# Patient Record
Sex: MALE | Race: WHITE | NOT HISPANIC OR LATINO | Employment: FULL TIME | URBAN - METROPOLITAN AREA
[De-identification: names, ages, dates, MRNs, and addresses within clinical notes are randomized per-mention and may not be internally consistent; named-entity substitution may affect disease eponyms.]

---

## 2021-03-11 ENCOUNTER — HOSPITAL ENCOUNTER (EMERGENCY)
Facility: CLINIC | Age: 31
Discharge: HOME | End: 2021-03-11
Attending: EMERGENCY MEDICINE | Admitting: EMERGENCY MEDICINE
Payer: MEDICAID

## 2021-03-11 VITALS
SYSTOLIC BLOOD PRESSURE: 133 MMHG | OXYGEN SATURATION: 100 % | RESPIRATION RATE: 18 BRPM | TEMPERATURE: 98.1 F | DIASTOLIC BLOOD PRESSURE: 88 MMHG | HEART RATE: 106 BPM | WEIGHT: 146 LBS

## 2021-03-11 DIAGNOSIS — L02.414 CUTANEOUS ABSCESS OF LEFT UPPER EXTREMITY: ICD-10-CM

## 2021-03-11 PROCEDURE — 99283 EMERGENCY DEPT VISIT LOW MDM: CPT | Mod: 25 | Performed by: EMERGENCY MEDICINE

## 2021-03-11 PROCEDURE — 87186 SC STD MICRODIL/AGAR DIL: CPT | Performed by: EMERGENCY MEDICINE

## 2021-03-11 PROCEDURE — 10060 I&D ABSCESS SIMPLE/SINGLE: CPT | Performed by: EMERGENCY MEDICINE

## 2021-03-11 PROCEDURE — 87077 CULTURE AEROBIC IDENTIFY: CPT | Performed by: EMERGENCY MEDICINE

## 2021-03-11 PROCEDURE — 87070 CULTURE OTHR SPECIMN AEROBIC: CPT | Performed by: EMERGENCY MEDICINE

## 2021-03-11 RX ORDER — SULFAMETHOXAZOLE/TRIMETHOPRIM 800-160 MG
1 TABLET ORAL 2 TIMES DAILY
Qty: 14 TABLET | Refills: 0 | Status: SHIPPED | OUTPATIENT
Start: 2021-03-11 | End: 2021-03-18

## 2021-03-11 NOTE — DISCHARGE INSTRUCTIONS
-May shower daily  -Keep the abscess covered until it heals over to avoid any infection spread.  -When changing of the dressing apply rubber gloves and disposable gloves and dressing into a plastic lined trash bag.    -You may enter rehab safely with no risk to other rehab participants as long as you keep the left forearm abscess properly covered and provide good hygiene when changing dressings.  -If you continue having a plugged sensation in your ear due to wax you may purchase over-the-counter Debrox.

## 2021-03-11 NOTE — ED TRIAGE NOTES
Pt is a Heroin addict.  Has been going through withdrawal about a week. Currently trying to go to treatment this afternoon.  He needs to be medically clear to be admitted there.

## 2021-03-12 ENCOUNTER — TELEPHONE (OUTPATIENT)
Dept: FAMILY MEDICINE | Facility: CLINIC | Age: 31
End: 2021-03-12

## 2021-03-12 NOTE — ED PROVIDER NOTES
History     Chief Complaint   Patient presents with     Wound Check     HPI  Ulysses Fajardo is a 31 year old male who presents for evaluation of skin abscess.  The patient has a history for heroin addiction.  IV use with frequent injections in his arms occasionally in his feet.  He has gone through initial phases of narcotic withdrawal/detox.  He is planning to enter a residential treatment facility this evening.  He is here with staff.  Staff members name is Mingo.  Mingo wanted him screened for any risk of potential soft tissue infections that could be potentially contagious specifically MRSA.  Patient has had no prior documentation for MRSA.  His primary concern is a fluctuant area on the left outer forearm.    Allergies:  No Known Allergies    Problem List:    There are no active problems to display for this patient.       Past Medical History:    No past medical history on file.    Past Surgical History:    No past surgical history on file.    Family History:    No family history on file.    Social History:  Marital Status:  Single [1]  Social History     Tobacco Use     Smoking status: Not on file   Substance Use Topics     Alcohol use: Not on file     Drug use: Not on file        Medications:    sulfamethoxazole-trimethoprim (BACTRIM DS) 800-160 MG tablet          Review of Systems   All other systems reviewed and are negative.      Physical Exam   BP: 133/88  Pulse: 106  Temp: 98.1  F (36.7  C)  Resp: 18  Weight: 66.2 kg (146 lb)  SpO2: 100 %      Physical Exam  Constitutional:       General: He is not in acute distress.     Appearance: He is not diaphoretic.   HENT:      Head: Atraumatic.   Eyes:      General: No scleral icterus.     Pupils: Pupils are equal, round, and reactive to light.   Cardiovascular:      Heart sounds: Normal heart sounds.   Pulmonary:      Effort: No respiratory distress.      Breath sounds: Normal breath sounds.   Abdominal:      General: Bowel sounds are normal.      Palpations:  Abdomen is soft.      Tenderness: There is no abdominal tenderness.   Musculoskeletal:         General: No tenderness.   Skin:     General: Skin is warm.      Findings: No rash.      Comments: Total-body skin survey notes that he has got extensive scarring on the upper arms and lower arms bilateral from prior IV drug use.  On his left forearm he has a 3 x 4 cm area of fluctuance its raised about 4mm.  There is no surrounding erythema and it had minimal tenderness.  There is an opening at the superior margin of the soft tissue abscess we had some evidence for recent spontaneous drainage.  There is no associated axillary adenopathy or lymphangitic spread of concern for infection.  The patient had no lesions of the skin on his chest abdomen back or groin.  Lower extremities did show some scarring from IV site use on the dorsum of his feet but nothing between the toes.  Onychomycosis of the right great toenail was noted with some dystrophic change.           ED Course        Procedures  Verbal consent from patient's  Left forearm was prepped with chlorhexidine  Infiltrated 1% lidocaine with epinephrine using a 30-gauge needle.  #11 blade was used to complete incision and drainage.  Approximately 1.5 cm incision was made  And this was extended into a small cruciate incision.  Drained less than 5 cc of purulent discharge with milking of the wound.  A culture was sent.  Dressing was applied.                     Assessments & Plan (with Medical Decision Making)  31-year-old male.  Detoxifying from heroin use.  Frequent use of heroin last 13 years with IV injection.  He is entering a residential treatment program.  Brought in to be screened for any contagious concerns including skin lesions abscesses that could potentially present MRSA.  We did note that on total skin assessment that he did have an abscess that was subcutaneous in his left forearm on the outer aspect there was an area 3 x 4 cm it was fluctuant.  He allowed  us to go through incision and drainage.  Culture was sent and is pending.     I have reviewed the nursing notes.    I have reviewed the findings, diagnosis, plan and need for follow up with the patient.      Discharge Medication List as of 3/11/2021  2:08 PM      START taking these medications    Details   sulfamethoxazole-trimethoprim (BACTRIM DS) 800-160 MG tablet Take 1 tablet by mouth 2 times daily for 7 days, Disp-14 tablet, R-0, E-Prescribe             Final diagnoses:   Cutaneous abscess of left upper extremity       3/11/2021   [facility] EMERGENCY DEPT     Josesito Benz,   03/11/21 1187

## 2021-03-12 NOTE — TELEPHONE ENCOUNTER
Patient is calling to report he was in the ED yesterday and they flushed his ear.  He states it was reported that he had fluid behind his ear drum.  He states he is still having a hard time hearing.    RN informed him to use a warm pack to his ear over the weekend along with a decongestant to dry up the fluid in his ear.  He is informed that if he develops a fever or severe pain, he can call back on Monday, 3/15/21, for a visit.  He is also on Bactrim, so should be fine over the weekend.    Closing this encounter.  Sudha Cardozo, THELMAN, RN

## 2021-03-12 NOTE — RESULT ENCOUNTER NOTE
[place] ED discharge antibiotic (if prescribed):  Sulfamethoxazole-Trimethoprim (Bactrim DS, Septra DS) 800-160 mg PO tablet,  1 tablet by mouth 2 times daily for 7 days.  Incision and Drainage performed in [place] ED [Yes / No] : No  No changes in treatment per ED lab result protocol.

## 2021-03-13 LAB
BACTERIA SPEC CULT: ABNORMAL
Lab: ABNORMAL
SPECIMEN SOURCE: ABNORMAL

## 2021-03-14 ENCOUNTER — TELEPHONE (OUTPATIENT)
Dept: EMERGENCY MEDICINE | Facility: CLINIC | Age: 31
End: 2021-03-14

## 2021-03-14 NOTE — TELEPHONE ENCOUNTER
[facility] Emergency Department Lab result notification:    Reason for call  Wound/abscess culture results   Lab Result  Final Wound culture (Left arm abscess) report on 3/13/21  Emergency Dept discharge antibiotic prescribed: Sulfamethoxazole-Trimethoprim (Bactrim DS, Septra DS) 800-160 mg PO tablet,  1 tablet by mouth 2 times daily for 7 days.  #1. Bacteria, Moderate growth Methicillin resistant Staphylococcus aureus (MRSA, which is [SUSCEPTIBLE] to antibiotic   Incision and Drainage performed in [place] ED [Yes / No]: Yes  Patient to be notified of result, symptoms assessed and advised per [place] ED lab result protocol.  ED visit Date: 3/11/21  Symptoms reported at ED visit  Wound Check      HPI  Ulysses Fajardo is a 31 year old male who presents for evaluation of skin abscess.  The patient has a history for heroin addiction.  IV use with frequent injections in his arms occasionally in his feet.  He has gone through initial phases of narcotic withdrawal/detox.  He is planning to enter a residential treatment facility this evening.  He is here with staff.  Staff members name is Mingo.  Mingo wanted him screened for any risk of potential soft tissue infections that could be potentially contagious specifically MRSA.  Patient has had no prior documentation for MRSA.  His primary concern is a fluctuant area on the left outer forearm.     Miscellaneous information      Current symptoms  3:13 Left with family member message requesting a call back to [facility] ED Lab Result RN at [phone].  RN is available every day between 10 a.m. and 6:30 p.m.      3:31 Spoke with patient he states the area is improving no pain stopped draining yesterday, no red streaks some redness at the site but patient states it is improved, no fevers.   Patient will continue home cares and antibiotics. Patient stated understanding and had no further questions. Reviewed signs and symptoms of when to be seen in ED  again, did offer follow up appointment, suggested urgent care as well but patient declined.  [RN Name]  Jess Pulido  [facility]  Emergency Dept Lab Result RN  Ph# [phone]      Copy of Lab result   Contains abnormal data Wound Culture Aerobic Bacterial  Order: [id number]  Status:  Final result   Visible to patient:  No (inaccessible in MyChart) Dx:  Cutaneous abscess of left upper extre...  Specimen Information: Left Arm    Abscess        Component 3d ago   Specimen Description Left Arm Abscess    Special Requests Specimen collected in eSwab transport (white cap)    Culture Micro Abnormal   Moderate growth   Methicillin resistant Staphylococcus aureus (MRSA)     [facility]   Susceptibility    Methicillin resistant staphylococcus aureus (mrsa) (1)    Antibiotic Interpretation Sensitivity Method Status   CLINDAMYCIN Sensitive <=0.25 ug/mL FLEX Final    This isolate DOES NOT demonstrate inducible clindamycin resistance in vitro.   Clindamycin is susceptible and could be used when indicated, however,   erythromycin is resistant and should not be used.   ERYTHROMYCIN Resistant >8.0 ug/mL FLEX Final   GENTAMICIN Sensitive <=0.5 ug/mL FLEX Final   OXACILLIN Resistant >4.0 ug/mL FLEX Final   TETRACYCLINE Sensitive <=1.0 ug/mL FLEX Final   Trimethoprim/Sulfa Sensitive <=0.5/9.5 ug/mL FLEX Final   VANCOMYCIN Sensitive 1.0 ug/mL FLEX Final   LINEZOLID Sensitive 2.0 ug/mL FLEX Final         Specimen Collected: 03/11/21  1:28 PM Last Resulted: 03/13/21 11:45 PM

## 2021-03-16 ENCOUNTER — HOSPITAL ENCOUNTER (EMERGENCY)
Facility: CLINIC | Age: 31
Discharge: HOME | End: 2021-03-16
Attending: FAMILY MEDICINE | Admitting: FAMILY MEDICINE
Payer: MEDICAID

## 2021-03-16 ENCOUNTER — APPOINTMENT (OUTPATIENT)
Dept: CT IMAGING | Facility: CLINIC | Age: 31
End: 2021-03-16
Attending: FAMILY MEDICINE
Payer: MEDICAID

## 2021-03-16 VITALS
WEIGHT: 153.2 LBS | SYSTOLIC BLOOD PRESSURE: 117 MMHG | HEART RATE: 82 BPM | OXYGEN SATURATION: 99 % | DIASTOLIC BLOOD PRESSURE: 61 MMHG | RESPIRATION RATE: 18 BRPM | TEMPERATURE: 98.5 F

## 2021-03-16 DIAGNOSIS — G43.109 MIGRAINE WITH AURA AND WITHOUT STATUS MIGRAINOSUS, NOT INTRACTABLE: ICD-10-CM

## 2021-03-16 DIAGNOSIS — F41.9 ANXIETY: ICD-10-CM

## 2021-03-16 LAB — GLUCOSE BLDC GLUCOMTR-MCNC: 93 MG/DL (ref 70–99)

## 2021-03-16 PROCEDURE — 96372 THER/PROPH/DIAG INJ SC/IM: CPT | Performed by: FAMILY MEDICINE

## 2021-03-16 PROCEDURE — 99285 EMERGENCY DEPT VISIT HI MDM: CPT | Mod: 25 | Performed by: FAMILY MEDICINE

## 2021-03-16 PROCEDURE — 70450 CT HEAD/BRAIN W/O DYE: CPT

## 2021-03-16 PROCEDURE — 250N000011 HC RX IP 250 OP 636: Performed by: FAMILY MEDICINE

## 2021-03-16 PROCEDURE — 999N001017 HC STATISTIC GLUCOSE BY METER IP

## 2021-03-16 PROCEDURE — 93005 ELECTROCARDIOGRAM TRACING: CPT | Performed by: FAMILY MEDICINE

## 2021-03-16 PROCEDURE — 99284 EMERGENCY DEPT VISIT MOD MDM: CPT | Performed by: FAMILY MEDICINE

## 2021-03-16 RX ORDER — KETOROLAC TROMETHAMINE 30 MG/ML
60 INJECTION, SOLUTION INTRAMUSCULAR; INTRAVENOUS ONCE
Status: COMPLETED | OUTPATIENT
Start: 2021-03-16 | End: 2021-03-16

## 2021-03-16 RX ORDER — HYDROXYZINE PAMOATE 25 MG/1
25 CAPSULE ORAL 3 TIMES DAILY PRN
Qty: 20 CAPSULE | Refills: 0 | Status: SHIPPED | OUTPATIENT
Start: 2021-03-16 | End: 2021-07-14

## 2021-03-16 RX ADMIN — KETOROLAC TROMETHAMINE 60 MG: 30 INJECTION, SOLUTION INTRAMUSCULAR at 17:32

## 2021-03-16 NOTE — ED PROVIDER NOTES
History     Chief Complaint   Patient presents with     Eye Problem     Anxiety     HPI  Ulysses Fajardo is a 31 year old male who presents with concerns of blurry vision that started about 20 to 30 minutes ago.  Patient was just walking when he noticed the symptoms.  He felt like he might even blackout.  He got concerned because his sister recently was diagnosed with a stroke and so he came in for evaluation.  Patient states that he feels like his vision is starting to get better now.  Patient does have a history of anxiety and is not on medications for it.  He also has been under a lot of stress and has not been sleeping here recently.  Denies any fevers or chills.  Denies a headache, denies any chest pain or shortness of breath.    Allergies:  No Known Allergies    Problem List:    There are no active problems to display for this patient.       Past Medical History:    No past medical history on file.    Past Surgical History:    No past surgical history on file.    Family History:    No family history on file.    Social History:  Marital Status:  Single [1]  Social History     Tobacco Use     Smoking status: Not on file   Substance Use Topics     Alcohol use: Not on file     Drug use: Not on file        Medications:    sulfamethoxazole-trimethoprim (BACTRIM DS) 800-160 MG tablet          Review of Systems   All other systems reviewed and are negative.      Physical Exam   BP: (!) 140/109  Pulse: 109  Temp: 98.5  F (36.9  C)  Resp: 18  Weight: 69.5 kg (153 lb 3.2 oz)  SpO2: 100 %      Physical Exam  Vitals signs and nursing note reviewed.   Constitutional:       General: He is not in acute distress.     Appearance: He is well-developed. He is not diaphoretic.   HENT:      Head: Normocephalic and atraumatic.      Nose: Nose normal.      Mouth/Throat:      Pharynx: No oropharyngeal exudate.   Eyes:      General: No scleral icterus.     Conjunctiva/sclera: Conjunctivae normal.      Pupils: Pupils are equal,  round, and reactive to light.   Neck:      Musculoskeletal: Normal range of motion.   Cardiovascular:      Rate and Rhythm: Normal rate and regular rhythm.      Heart sounds: Normal heart sounds. No murmur. No friction rub.   Pulmonary:      Effort: Pulmonary effort is normal. No respiratory distress.      Breath sounds: Normal breath sounds. No wheezing or rales.   Abdominal:      General: Bowel sounds are normal. There is no distension.      Palpations: Abdomen is soft. There is no mass.      Tenderness: There is no abdominal tenderness. There is no guarding or rebound.   Musculoskeletal: Normal range of motion.         General: No tenderness.   Skin:     General: Skin is warm.      Findings: No rash.   Neurological:      Mental Status: He is alert and oriented to person, place, and time.      Comments: National [employer] Stroke Scale  Exam Interval: Baseline   Score   Level of consciousness: (0)   Alert, keenly responsive   LOC questions: (0)   Answers both questions correctly   LOC commands: (0)   Performs both tasks correctly   Best gaze: (0)   Normal   Visual: (0)   No visual loss   Facial palsy: (0)   Normal symmetrical movements   Motor arm (left): (0)   No drift   Motor arm (right): (0)   No drift   Motor leg (left): (0)   No drift   Motor leg (right): (0)   No drift   Limb ataxia: (0)   Absent   Sensory: (0)   Normal- no sensory loss   Best language: (0)   Normal- no aphasia   Dysarthria: (0)   Normal   Extinction and inattention: (0)   No abnormality      Total Score:  0     Psychiatric:         Judgment: Judgment normal.         ED Course  Code stroke was called by nursing because of the visual changes that happened 30 minutes ago and because the sister had a stroke who is 1 year older than him.  Patient had no other neurological deficits.  This was quickly deescalated once I saw and examined the patient.        Procedures      Results for orders placed or performed during the hospital  encounter of 03/16/21 (from the past 24 hour(s))   Glucose by meter   Result Value Ref Range    Glucose 93 70 - 99 mg/dL   CT Head w/o Contrast    Narrative    CT SCAN OF THE HEAD WITHOUT CONTRAST   3/16/2021 5:03 PM     HISTORY: Headaches and visual changes    TECHNIQUE:  Axial images of the head and coronal reformations without  IV contrast material. Radiation dose for this scan was reduced using  automated exposure control, adjustment of the mA and/or kV according  to patient size, or iterative reconstruction technique.    COMPARISON: None.    FINDINGS:  The cerebral hemispheres, brainstem, and cerebellum demonstrate normal  morphology and attenuation. No evidence of acute ischemia, hemorrhage,  mass, mass effect or hydrocephalus. The visualized calvarium, tympanic  cavities, mastoid cavities, and paranasal sinuses are unremarkable.      Impression    IMPRESSION:   No acute intracranial abnormality.    FRANCHESCA NEWTON MD       Medications - No data to display     CT scan was obtained and was unremarkable.  Patient continues to have no further visual changes but he is starting to get a little bit of a headache.  Talking to the patient further he has a history of recurrent headaches where he gets very nauseated and gets light sensitivity.  Because he has been dealing with a lot of stress, he has not been sleeping well here recently.  I think that his symptoms with his vision or most likely preaura and he is starting to get a migraine now at this point.  I am not suspecting a stroke or any significant intracranial process.  We will give the patient a shot of Toradol here now and discharge the patient home on Vistaril.  This should help him be able to sleep and help with some of his anxiety symptoms.  Patient is safe to be discharged at this time.    Assessments & Plan (with Medical Decision Making)  Migraine with aura, anxiety     I have reviewed the nursing notes.    I have reviewed the findings, diagnosis, plan  and need for follow up with the patient.      3/16/2021   [facility] EMERGENCY DEPT     Dale Godinez MD  03/16/21 [id number]

## 2021-03-16 NOTE — ED TRIAGE NOTES
He has had blurred vision for the past 20 minutes. He says he is very anxious and has had blurred vision with anxiety in the past but this is lasting longer. He also has stopped using heroin for 10 days and lost his sister who was 1 year older than him from a stroke 3 days ago.

## 2021-04-13 ENCOUNTER — HOSPITAL ENCOUNTER (EMERGENCY)
Facility: CLINIC | Age: 31
Discharge: HOME | End: 2021-04-13
Attending: NURSE PRACTITIONER | Admitting: NURSE PRACTITIONER
Payer: MEDICAID

## 2021-04-13 VITALS
RESPIRATION RATE: 18 BRPM | TEMPERATURE: 98.5 F | SYSTOLIC BLOOD PRESSURE: 140 MMHG | DIASTOLIC BLOOD PRESSURE: 84 MMHG | OXYGEN SATURATION: 98 % | HEART RATE: 90 BPM

## 2021-04-13 DIAGNOSIS — J06.9 VIRAL URI: ICD-10-CM

## 2021-04-13 LAB
FLUAV RNA RESP QL NAA+PROBE: NEGATIVE
FLUBV RNA RESP QL NAA+PROBE: NEGATIVE
LABORATORY COMMENT REPORT: ABNORMAL
RSV RNA SPEC QL NAA+PROBE: NEGATIVE
SARS-COV-2 RNA RESP QL NAA+PROBE: POSITIVE
SPECIMEN SOURCE: ABNORMAL

## 2021-04-13 PROCEDURE — 87636 SARSCOV2 & INF A&B AMP PRB: CPT | Performed by: NURSE PRACTITIONER

## 2021-04-13 PROCEDURE — 99284 EMERGENCY DEPT VISIT MOD MDM: CPT | Performed by: NURSE PRACTITIONER

## 2021-04-13 PROCEDURE — 99283 EMERGENCY DEPT VISIT LOW MDM: CPT | Performed by: NURSE PRACTITIONER

## 2021-04-13 PROCEDURE — C9803 HOPD COVID-19 SPEC COLLECT: HCPCS | Performed by: NURSE PRACTITIONER

## 2021-04-13 NOTE — DISCHARGE INSTRUCTIONS
Covid-19 test obtained and is pending. Likely you will have the results later today or tomorrow morning.  Continue to apply ear drops at bedtime, then do hot showers in the morning.  If persistent for more than 5 more days recheck for ear irrigation.

## 2021-04-13 NOTE — ED TRIAGE NOTES
"Pt stated \"Im in a treatment facility currently and everyone has tested positive for COVID except me. I am now starting to feel congested. Also I cant really hear out of my right ear.\"  "

## 2021-04-13 NOTE — ED PROVIDER NOTES
History     Chief Complaint   Patient presents with     Covid Concern     HPI  Ulysses Fajardo is a 31 year old male who presents to the emergency department for evaluation of nasal congestion and exposure to covid-19 infection.  Patient is currently residing at a treatment facility and states that many of the residents there have tested positive for COVID-19.  Patient is requesting testing for Covid today.  Denies fever or chills.  Patient denies chest pain or shortness of breath.  Denies significant cough.  Denies diarrhea. Pt complains of diminished hearing from right ear, he is currently treating cerumen impaction with over the counter ear drops.    Allergies:  No Known Allergies    Problem List:    There are no active problems to display for this patient.       Past Medical History:    History reviewed. No pertinent past medical history.    Past Surgical History:    No past surgical history on file.    Family History:    No family history on file.    Social History:  Marital Status:  Single [1]  Social History     Tobacco Use     Smoking status: None   Substance Use Topics     Alcohol use: None     Drug use: None        Medications:    hydrOXYzine (VISTARIL) 25 MG capsule          Review of Systems  As mentioned above in the history present illness. All other systems were reviewed and are negative.    Physical Exam   BP: (!) 142/82  Pulse: 92  Temp: 98.5  F (36.9  C)  Resp: 18  SpO2: 99 %      Physical Exam    GENERAL APPEARANCE: alert and oriented. NAD.   EYES: conjunctiva clear  HENT: Right ear canal with cerumen impaction, unable to visualize the right TM. Left ear canal with moderate cerumen. Left  TM normal. Nose normal.  Oropharynx without ulcers, erythema or lesions  NECK: supple, nontender, no lymphadenopathy  RESP: lungs clear to auscultation - no rales, rhonchi or wheezes  CV: regular rates and rhythm, normal S1 S2, no murmur noted      ED Course        Procedures                 No results found  for this or any previous visit (from the past 24 hour(s)).    Medications - No data to display    Assessments & Plan (with Medical Decision Making)   Covid-19 test obtained and is pending. Likely you will have the results later today or tomorrow morning.  Continue to apply ear drops at bedtime, then do hot showers in the morning.  If persistent for more than 5 more days recheck for ear irrigation.    I have reviewed the nursing notes.    I have reviewed the findings, diagnosis, plan and need for follow up with the patient.      Discharge Medication List as of 4/13/2021  3:09 PM          Final diagnoses:   Viral URI - covid test is pending.       4/13/2021   [facility] EMERGENCY DEPT     Brunilda, YISSEL Atkinson CNP  04/13/21 1549

## 2021-04-14 ENCOUNTER — TELEPHONE (OUTPATIENT)
Dept: EMERGENCY MEDICINE | Facility: CLINIC | Age: 31
End: 2021-04-14

## 2021-04-14 NOTE — TELEPHONE ENCOUNTER
"Coronavirus (COVID-19) Notification    Caller Name (Patient, parent, daughter/son, grandparent, etc)  Patient    Reason for call  Notify of Positive Coronavirus (COVID-19) lab results, assess symptoms,  review  [facility] recommendations    Lab Result    Lab test:  2019-nCoV rRt-PCR or SARS-CoV-2 PCR    Oropharyngeal AND/OR nasopharyngeal swabs is POSITIVE for 2019-nCoV RNA/SARS-COV-2 PCR (COVID-19 virus)    RN Recommendations/Instructions per [facility] Coronavirus COVID-19 recommendations    Brief introduction script  Introduce self then review script:  \"I am calling on behalf of [employer].  We were notified that your Coronavirus test (COVID-19) for was POSITIVE for the virus.  I have some information to relay to you but first I wanted to mention that the [place] Dept of Health will be contacting you shortly [it's possible MD already called Patient] to talk to you more about how you are feeling and other people you have had contact with who might now also have the virus.  Also,  [employer] is Partnering with the [facility] for Covid-19 research, you may be contacted directly by research staff.\"    Assessment (Inquire about Patient's current symptoms)   Assessment   Current Symptoms at time of phone call: (if no symptoms, document No symptoms] No symptoms   Symptoms onset (if applicable) n/a     If at time of call, Patients symptoms hare worsened, the Patient should contact 911 or have someone drive them to Emergency Dept promptly:      If Patient calling 911, inform 911 personal that you have tested positive for the Coronavirus (COVID-19).  Place mask on and await 911 to arrive.    If Emergency Dept, If possible, please have another adult drive you to the Emergency Dept but you need to wear mask when in contact with other people.      Monoclonal Antibody Administration    You may be eligible to receive a new treatment with a monoclonal antibody for preventing hospitalization in patients " "at high risk for complications from COVID-19.   This medication is still experimental and available on a limited basis; it is given through an IV and must be given at an infusion center. Please note that not all people who are eligible will receive the medication since it is in limited supply.     Are you interested in being considered for this medication?  Yes.   Is the patient symptomatic? No. Patient does not qualify.  Does the patient fit the criteria: No    If patient qualifies based on above criteria:  \"You will be contacted if you are selected to receive this treatment in the next 1-2 business days.   This is time sensitive and if you are not selected in the next 1-2 business days, you will not receive the medication.  If you do not receive a call to schedule, you have not been selected.\"      Review information with Patient    Your result was positive. This means you have COVID-19 (coronavirus).  We have sent you a letter that reviews the information that I'll be reviewing with you now.    How can I protect others?    If you have symptoms: stay home and away from others (self-isolate) until:    You've had no fever--and no medicine that reduces fever--for 1 full day (24 hours). And       Your other symptoms have gotten better. For example, your cough or breathing has improved. And     At least 10 days have passed since your symptoms started. (If you've been told by a doctor that you have a weak immune system, wait 20 days.)     If you don't have symptoms: Stay home and away from others (self-isolate) until at least 10 days have passed since your first positive COVID-19 test. (Date test collected)    During this time:    Stay in your own room, including for meals. Use your own bathroom if you can.    Stay away from others in your home. No hugging, kissing or shaking hands. No visitors.     Don't go to work, school or anywhere else.     Clean  high touch  surfaces often (doorknobs, counters, handles, etc.). Use " a household cleaning spray or wipes. You'll find a full list on the EPA website at www.epa.gov/pesticide-registration/list-n-disinfectants-use-against-sars-cov-2.     Cover your mouth and nose with a mask, tissue or other face covering to avoid spreading germs.    Wash your hands and face often with soap and water.    Make a list of people you have been in close contact with recently, even if either of you wore a face covering.   ; Start your list from 2 days before you became ill or had a positive test.  ; Include anyone that was within 6 feet of you for a cumulative total of 15 minutes or more in 24 hours. (Example: if you sat next to Paul for 5 minutes in the morning and 10 minutes in the afternoon, then you were in close contact for 15 minutes total that day. Paul would be added to your list.)    A public health worker will call or text you. It is important that you answer. They will ask you questions about possible exposures to COVID-19, such as people you have been in direct contact with and places you have visited.    Tell the people on your list that you have COVID-19; they should stay away from others for 14 days starting from the last time they were in contact with you (unless you are told something different from a public health worker).     Caregivers in these groups are at risk for severe illness due to COVID-19:  o People 65 years and older  o People who live in a nursing home or long-term care facility  o People with chronic disease (lung, heart, cancer, diabetes, kidney, liver, immunologic)  o People who have a weakened immune system, including those who:  - Are in cancer treatment  - Take medicine that weakens the immune system, such as corticosteroids  - Had a bone marrow or organ transplant  - Have an immune deficiency  - Have poorly controlled HIV or AIDS  - Are obese (body mass index of 40 or higher)  - Smoke regularly    Caregivers should wear gloves while washing dishes, handling laundry and  cleaning bedrooms and bathrooms.    Wash and dry laundry with special caution. Don't shake dirty laundry, and use the warmest water setting you can.    If you have a weakened immune system, ask your doctor about other actions you should take.    For more tips, go to www.cdc.gov/coronavirus/2019-ncov/downloads/10Things.pdf.    You should not go back to work until you meet the guidelines above for ending your home isolation. You don't need to be retested for COVID-19 before going back to work--studies show that you won't spread the virus if it's been at least 10 days since your symptoms started (or 20 days, if you have a weak immune system).    Employers: This document serves as formal notice of your employee's medical guidelines for going back to work. They must meet the above guidelines before going back to work in person.    How can I take care of myself?    1. Get lots of rest. Drink extra fluids (unless a doctor has told you not to).    2. Take Tylenol (acetaminophen) for fever or pain. If you have liver or kidney problems, ask your family doctor if it's okay to take Tylenol.     Take either:     650 mg (two 325 mg pills) every 4 to 6 hours, or     1,000 mg (two 500 mg pills) every 8 hours as needed.     Note: Don't take more than 3,000 mg in one day. Acetaminophen is found in many medicines (both prescribed and over-the-counter medicines). Read all labels to be sure you don't take too much.    For children, check the Tylenol bottle for the right dose (based on their age or weight).    3. If you have other health problems (like cancer, heart failure, an organ transplant or severe kidney disease): Call your specialty clinic if you don't feel better in the next 2 days.    4. Know when to call 911: Emergency warning signs include:    Trouble breathing or shortness of breath    Pain or pressure in the chest that doesn't go away    Feeling confused like you haven't felt before, or not being able to wake  up    Bluish-colored lips or face    5. Sign up for [employer]. We know it's scary to hear that you have COVID-19. We want to track your symptoms to make sure you're okay over the next 2 weeks. Please look for an email from [employer]--this is a free, online program that we'll use to keep in touch. To sign up, follow the link in the email. Learn more at [email or web address].    Where can I get more information?    [facility] [place]: [email or web address]    Coronavirus Basics: www.health.[facility].[place]./diseases/coronavirus/basics.html    What to Do If You're Sick: www.cdc.gov/coronavirus/2019-ncov/about/steps-when-sick.html    Ending Home Isolation: www.cdc.gov/coronavirus/2019-ncov/hcp/disposition-in-home-patients.html     Caring for Someone with COVID-19: www.cdc.gov/coronavirus/2019-ncov/if-you-are-sick/care-for-someone.html     [facility] clinical trials (COVID-19 research studies): clinicalaffairs.[facility]-clinical-trials     A Positive COVID-19 letter will be sent via "[employer] or the mail. (Exception, no letters sent to Presurgerical/Preprocedure Patients)    Salina Mercado LPN

## 2021-05-12 ENCOUNTER — IMMUNIZATION (OUTPATIENT)
Dept: FAMILY MEDICINE | Facility: CLINIC | Age: 31
End: 2021-05-12
Payer: COMMERCIAL

## 2021-05-12 PROCEDURE — 91301 PR COVID VAC MODERNA 100 MCG/0.5 ML IM: CPT

## 2021-05-12 PROCEDURE — 0011A PR COVID VAC MODERNA 100 MCG/0.5 ML IM: CPT

## 2021-07-14 ENCOUNTER — OFFICE VISIT (OUTPATIENT)
Dept: INTERNAL MEDICINE | Facility: CLINIC | Age: 31
End: 2021-07-14
Payer: COMMERCIAL

## 2021-07-14 VITALS
BODY MASS INDEX: 23.43 KG/M2 | SYSTOLIC BLOOD PRESSURE: 114 MMHG | OXYGEN SATURATION: 98 % | HEIGHT: 73 IN | RESPIRATION RATE: 16 BRPM | DIASTOLIC BLOOD PRESSURE: 66 MMHG | WEIGHT: 176.8 LBS | HEART RATE: 92 BPM | TEMPERATURE: 97.9 F

## 2021-07-14 DIAGNOSIS — Z87.898 HISTORY OF INTRAVENOUS DRUG USE IN REMISSION: ICD-10-CM

## 2021-07-14 DIAGNOSIS — Z82.49: ICD-10-CM

## 2021-07-14 DIAGNOSIS — B18.2 CHRONIC HEPATITIS C WITHOUT HEPATIC COMA (H): ICD-10-CM

## 2021-07-14 DIAGNOSIS — A49.02 MRSA INFECTION: ICD-10-CM

## 2021-07-14 DIAGNOSIS — Z00.01 ENCOUNTER FOR ROUTINE ADULT MEDICAL EXAM WITH ABNORMAL FINDINGS: Primary | ICD-10-CM

## 2021-07-14 LAB
ALBUMIN SERPL-MCNC: 4.6 G/DL (ref 3.4–5)
ALP SERPL-CCNC: 94 U/L (ref 40–150)
ALT SERPL W P-5'-P-CCNC: 21 U/L (ref 0–70)
ANION GAP SERPL CALCULATED.3IONS-SCNC: 4 MMOL/L (ref 3–14)
AST SERPL W P-5'-P-CCNC: 7 U/L (ref 0–45)
BASOPHILS # BLD AUTO: 0.1 10E3/UL (ref 0–0.2)
BASOPHILS NFR BLD AUTO: 1 %
BILIRUB SERPL-MCNC: 0.5 MG/DL (ref 0.2–1.3)
BUN SERPL-MCNC: 13 MG/DL (ref 7–30)
CALCIUM SERPL-MCNC: 9.8 MG/DL (ref 8.5–10.1)
CHLORIDE BLD-SCNC: 110 MMOL/L (ref 94–109)
CHOLEST SERPL-MCNC: 193 MG/DL
CO2 SERPL-SCNC: 27 MMOL/L (ref 20–32)
CREAT SERPL-MCNC: 0.89 MG/DL (ref 0.66–1.25)
EOSINOPHIL # BLD AUTO: 0.1 10E3/UL (ref 0–0.7)
EOSINOPHIL NFR BLD AUTO: 2 %
ERYTHROCYTE [DISTWIDTH] IN BLOOD BY AUTOMATED COUNT: 13.4 % (ref 10–15)
FASTING STATUS PATIENT QL REPORTED: YES
GFR SERPL CREATININE-BSD FRML MDRD: >90 ML/MIN/1.73M2
GLUCOSE BLD-MCNC: 86 MG/DL (ref 70–99)
HCT VFR BLD AUTO: 51.7 % (ref 40–53)
HDLC SERPL-MCNC: 50 MG/DL
HGB BLD-MCNC: 17.4 G/DL (ref 13.3–17.7)
IMM GRANULOCYTES # BLD: 0 10E3/UL
IMM GRANULOCYTES NFR BLD: 0 %
LDLC SERPL CALC-MCNC: 130 MG/DL
LYMPHOCYTES # BLD AUTO: 1.5 10E3/UL (ref 0.8–5.3)
LYMPHOCYTES NFR BLD AUTO: 23 %
MCH RBC QN AUTO: 29.5 PG (ref 26.5–33)
MCHC RBC AUTO-ENTMCNC: 33.7 G/DL (ref 31.5–36.5)
MCV RBC AUTO: 88 FL (ref 78–100)
MONOCYTES # BLD AUTO: 0.4 10E3/UL (ref 0–1.3)
MONOCYTES NFR BLD AUTO: 6 %
NEUTROPHILS # BLD AUTO: 4.3 10E3/UL (ref 1.6–8.3)
NEUTROPHILS NFR BLD AUTO: 68 %
NONHDLC SERPL-MCNC: 143 MG/DL
NRBC # BLD AUTO: 0 10E3/UL
NRBC BLD AUTO-RTO: 0 /100
PLATELET # BLD AUTO: 335 10E3/UL (ref 150–450)
POTASSIUM BLD-SCNC: 4.2 MMOL/L (ref 3.4–5.3)
PROT SERPL-MCNC: 8.2 G/DL (ref 6.8–8.8)
RBC # BLD AUTO: 5.9 10E6/UL (ref 4.4–5.9)
SODIUM SERPL-SCNC: 141 MMOL/L (ref 133–144)
TRIGL SERPL-MCNC: 65 MG/DL
WBC # BLD AUTO: 6.3 10E3/UL (ref 4–11)

## 2021-07-14 PROCEDURE — 87389 HIV-1 AG W/HIV-1&-2 AB AG IA: CPT | Performed by: INTERNAL MEDICINE

## 2021-07-14 PROCEDURE — 99385 PREV VISIT NEW AGE 18-39: CPT | Performed by: INTERNAL MEDICINE

## 2021-07-14 PROCEDURE — 80061 LIPID PANEL: CPT | Performed by: INTERNAL MEDICINE

## 2021-07-14 PROCEDURE — 85025 COMPLETE CBC W/AUTO DIFF WBC: CPT | Performed by: INTERNAL MEDICINE

## 2021-07-14 PROCEDURE — 99213 OFFICE O/P EST LOW 20 MIN: CPT | Mod: 25 | Performed by: INTERNAL MEDICINE

## 2021-07-14 PROCEDURE — 36415 COLL VENOUS BLD VENIPUNCTURE: CPT | Performed by: INTERNAL MEDICINE

## 2021-07-14 PROCEDURE — 87340 HEPATITIS B SURFACE AG IA: CPT | Performed by: INTERNAL MEDICINE

## 2021-07-14 PROCEDURE — 86706 HEP B SURFACE ANTIBODY: CPT | Performed by: INTERNAL MEDICINE

## 2021-07-14 PROCEDURE — 80053 COMPREHEN METABOLIC PANEL: CPT | Performed by: INTERNAL MEDICINE

## 2021-07-14 PROCEDURE — 87040 BLOOD CULTURE FOR BACTERIA: CPT | Performed by: INTERNAL MEDICINE

## 2021-07-14 PROCEDURE — 86704 HEP B CORE ANTIBODY TOTAL: CPT | Performed by: INTERNAL MEDICINE

## 2021-07-14 PROCEDURE — 86708 HEPATITIS A ANTIBODY: CPT | Performed by: INTERNAL MEDICINE

## 2021-07-14 PROCEDURE — 87522 HEPATITIS C REVRS TRNSCRPJ: CPT | Performed by: INTERNAL MEDICINE

## 2021-07-14 ASSESSMENT — ENCOUNTER SYMPTOMS
NERVOUS/ANXIOUS: 0
CONSTIPATION: 0
WEAKNESS: 0
DIARRHEA: 0
JOINT SWELLING: 0
CHILLS: 0
COUGH: 0
FEVER: 0
HEADACHES: 0
EYE PAIN: 0
MYALGIAS: 0
PALPITATIONS: 0
SHORTNESS OF BREATH: 0
HEARTBURN: 0
PARESTHESIAS: 0
HEMATURIA: 0
ABDOMINAL PAIN: 0
NAUSEA: 0
DIZZINESS: 0
HEMATOCHEZIA: 0
ARTHRALGIAS: 0
DYSURIA: 0
SORE THROAT: 0
FREQUENCY: 0

## 2021-07-14 ASSESSMENT — PAIN SCALES - GENERAL: PAINLEVEL: NO PAIN (0)

## 2021-07-14 ASSESSMENT — MIFFLIN-ST. JEOR: SCORE: 1820.84

## 2021-07-14 NOTE — PROGRESS NOTES
SUBJECTIVE:   CC: Ulysses Fajardo is an 31 year old male who presents for preventative health visit.     Patient has been advised of split billing requirements and indicates understanding: Yes  Healthy Habits:     Getting at least 3 servings of Calcium per day:  Yes    Bi-annual eye exam:  Yes    Dental care twice a year:  NO    Sleep apnea or symptoms of sleep apnea:  Daytime drowsiness, Excessive snoring and Sleep apnea    Diet:  Regular (no restrictions)    Frequency of exercise:  2-3 days/week    Duration of exercise:  15-30 minutes    Taking medications regularly:  Yes    Medication side effects:  None    PHQ-2 Total Score: 0    Additional concerns today:  No    Patient is here with his sister who I know.  He moved here from [place] a few months ago went through detox from IV drug use.  He has a known diagnosis of hepatitis C but has not been seen by medical professional for many years.    He has been in the emergency room a couple times with a migraine during detox, abscess on his arm which was MRSA.    Skin is getting better since not using IV drugs.  His weight is much improved  He is eating well denies any other symptoms.  Has a little bit of hearing decrease in the left ear      PROBLEMS TO ADD ON...  Heart concerns- family history     Today's PHQ-2 Score:   PHQ-2 ( 1999 Pfizer) 7/14/2021   Q1: Little interest or pleasure in doing things 0   Q2: Feeling down, depressed or hopeless 0   PHQ-2 Score 0   Q1: Little interest or pleasure in doing things Not at all   Q2: Feeling down, depressed or hopeless Not at all   PHQ-2 Score 0     Abuse: Current or Past(Physical, Sexual or Emotional)- No  Do you feel safe in your environment? Yes    Have you ever done Advance Care Planning? (For example, a Health Directive, POLST, or a discussion with a medical provider or your loved ones about your wishes): No, advance care planning information given to patient to review.  Patient declined advance care planning discussion  "at this time.    Social History     Tobacco Use     Smoking status: Current Every Day Smoker     Packs/day: 1.00     Smokeless tobacco: Never Used   Substance Use Topics     Alcohol use: Yes     If you drink alcohol do you typically have >3 drinks per day or >7 drinks per week? No    Alcohol Use 7/14/2021   Prescreen: >3 drinks/day or >7 drinks/week? No     Last PSA: No results found for: PSA    Reviewed orders with patient. Reviewed health maintenance and updated orders accordingly - Yes  Lab work is in process    Reviewed and updated as needed this visit by clinical staff  Tobacco  Allergies  Meds   Med Hx  Surg Hx  Fam Hx  Soc Hx        Reviewed and updated as needed this visit by Provider                    Review of Systems   Constitutional: Negative for chills and fever.   HENT: Negative for congestion, ear pain, hearing loss and sore throat.    Eyes: Negative for pain and visual disturbance.   Respiratory: Negative for cough and shortness of breath.    Cardiovascular: Negative for chest pain, palpitations and peripheral edema.   Gastrointestinal: Negative for abdominal pain, constipation, diarrhea, heartburn, hematochezia and nausea.   Genitourinary: Negative for dysuria, frequency, genital sores, hematuria and urgency.   Musculoskeletal: Negative for arthralgias, joint swelling and myalgias.   Skin: Negative for rash.   Neurological: Negative for dizziness, weakness, headaches and paresthesias.   Psychiatric/Behavioral: Negative for mood changes. The patient is not nervous/anxious.          OBJECTIVE:   /66   Pulse 92   Temp 97.9  F (36.6  C) (Temporal)   Resp 16   Ht 1.854 m (6' 1\")   Wt 80.2 kg (176 lb 12.8 oz)   SpO2 98%   BMI 23.33 kg/m      Physical Exam  GENERAL: healthy, alert and no distress  EYES: Eyes grossly normal to inspection, PERRL and conjunctivae and sclerae normal  HENT: ear canals and TM's normal, nose and mouth without ulcers or lesions  NECK: no adenopathy, no " asymmetry, masses, or scars and thyroid normal to palpation  RESP: lungs clear to auscultation - no rales, rhonchi or wheezes  CV: regular rate and rhythm, normal S1 S2, no S3 or S4, no murmur, click or rub, no peripheral edema and peripheral pulses strong  ABDOMEN: soft, nontender, no hepatosplenomegaly, no masses and bowel sounds normal  MS: no gross musculoskeletal defects noted, no edema  SKIN: scarring from IV drug use on his arms   NEURO: Normal strength and tone, mentation intact and speech normal  PSYCH: mentation appears normal, affect normal/bright    ASSESSMENT/PLAN:       ICD-10-CM    1. Encounter for routine adult medical exam with abnormal findings  Z00.01 CBC with platelets and differential     Comprehensive metabolic panel (BMP + Alb, Alk Phos, ALT, AST, Total. Bili, TP)     Lipid panel reflex to direct LDL Fasting   2. Chronic hepatitis C without hepatic coma (H)  B18.2 Echocardiogram Complete     GASTROENTEROLOGY ADULT REF CONSULT ONLY     CBC with platelets and differential     Comprehensive metabolic panel (BMP + Alb, Alk Phos, ALT, AST, Total. Bili, TP)     HIV Antigen Antibody Combo     Hepatitis C RNA, quantitative     Hepatitis B core antibody     Hepatitis B Surface Antibody     Hepatitis B surface antigen     Hepatitis Antibody A IgG     Blood Culture Peripheral Blood     OFFICE/OUTPT VISIT,EST,LEVL IV   3. History of intravenous drug use in remission  Z87.898 Echocardiogram Complete     HIV Antigen Antibody Combo     Hepatitis C RNA, quantitative     Hepatitis B core antibody     Blood Culture Peripheral Blood     OFFICE/OUTPT VISIT,EST,LEVL IV   4. Family history of endocarditis  Z82.49 HIV Antigen Antibody Combo     Blood Culture Peripheral Blood   5. MRSA infection  A49.02      Patient here for physical examination.  He is doing much better.  He has been off IV drugs for 4 months.  He has a history of hepatitis C without a recent work-up.  We will do a lab work-up today to get his viral  "load, refer him to hepatology to get possible treatments.  We will check for hepatitis B and hepatitis A and HIV.    We will do his regular general labs.    I will do a blood culture with his sister having endocarditis and dying from it a few months ago.    Recommended smoking cessation    Recommended he follow-up to discuss other issues in the future.    Discussed seeing him in the future and then will try to do MRSA treatment at that time.    Patient has been advised of split billing requirements and indicates understanding: Yes  COUNSELING:   Reviewed preventive health counseling, as reflected in patient instructions       Regular exercise       Healthy diet/nutrition    Estimated body mass index is 23.33 kg/m  as calculated from the following:    Height as of this encounter: 1.854 m (6' 1\").    Weight as of this encounter: 80.2 kg (176 lb 12.8 oz).         He reports that he has been smoking. He has been smoking about 1.00 pack per day. He has never used smokeless tobacco.      Counseling Resources:  ATP IV Guidelines  Pooled Cohorts Equation Calculator  FRAX Risk Assessment  ICSI Preventive Guidelines  Dietary Guidelines for Americans, 2010  USDA's MyPlate  ASA Prophylaxis  Lung CA Screening    Raymon Fajardo MD  [facility]  "

## 2021-07-15 LAB
HAV IGG SER QL IA: REACTIVE
HBV CORE AB SERPL QL IA: NONREACTIVE
HBV SURFACE AB SERPL IA-ACNC: 0 M[IU]/ML
HBV SURFACE AG SERPL QL IA: NONREACTIVE
HIV 1+2 AB+HIV1 P24 AG SERPL QL IA: NONREACTIVE

## 2021-07-18 LAB — HCV RNA SERPL NAA+PROBE-ACNC: NOT DETECTED IU/ML

## 2021-07-19 ENCOUNTER — MYC MEDICAL ADVICE (OUTPATIENT)
Dept: INTERNAL MEDICINE | Facility: CLINIC | Age: 31
End: 2021-07-19

## 2021-07-19 LAB — BACTERIA BLD CULT: NO GROWTH

## 2021-07-20 NOTE — TELEPHONE ENCOUNTER
RECORDS RECEIVED FROM: Internal   Appt Date: 08.03.2021   NOTES STATUS DETAILS   OFFICE NOTE from referring provider Internal 07.14.2021 Consult Raymon Fajardo MD   OFFICE NOTES from other specialists N/A    DISCHARGE SUMMARY from hospital N/A    MEDICATION LIST N/A    LIVER BIOSPY (IF APPLICABLE)      PATHOLOGY REPORTS  N/A    IMAGING     ENDOSCOPY (IF AVAILABLE) N/A    COLONOSCOPY (IF AVAILABLE) N/A    ULTRASOUND LIVER N/A    CT OF ABDOMEN N/A    MRI OF LIVER N/A    FIBROSCAN, US ELASTOGRAPHY, FIBROSIS SCAN, MR ELASTOGRAPHY N/A    LABS     HEPATIC PANEL (LIVER PANEL) N/A    BASIC METABOLIC PANEL N/A    COMPLETE METABOLIC PANEL Internal 07.14.2021   COMPLETE BLOOD COUNT (CBC) Internal 07.14.2021   INTERNATIONAL NORMALIZED RATIO (INR) N/A    HEPATITIS C ANTIBODY N/A    HEPATITIS C VIRAL LOAD/PCR N/A    HEPATITIS C GENOTYPE N/A    HEPATITIS B SURFACE ANTIGEN Internal 07.14.2021   HEPATITIS B SURFACE ANTIBODY Internal 07.14.2021   HEPATITIS B DNA QUANT LEVEL N/A    HEPATITIS B CORE ANTIBODY Internal 07.14.2021

## 2021-07-30 ENCOUNTER — HOSPITAL ENCOUNTER (OUTPATIENT)
Dept: CARDIOLOGY | Facility: CLINIC | Age: 31
Discharge: HOME | End: 2021-07-30
Attending: INTERNAL MEDICINE | Admitting: INTERNAL MEDICINE
Payer: COMMERCIAL

## 2021-07-30 DIAGNOSIS — Z87.898 HISTORY OF INTRAVENOUS DRUG USE IN REMISSION: ICD-10-CM

## 2021-07-30 DIAGNOSIS — B18.2 CHRONIC HEPATITIS C WITHOUT HEPATIC COMA (H): ICD-10-CM

## 2021-07-30 LAB — LVEF ECHO: NORMAL

## 2021-07-30 PROCEDURE — 93306 TTE W/DOPPLER COMPLETE: CPT | Mod: 26 | Performed by: INTERNAL MEDICINE

## 2021-07-30 PROCEDURE — 93306 TTE W/DOPPLER COMPLETE: CPT

## 2021-08-03 ENCOUNTER — PRE VISIT (OUTPATIENT)
Dept: GASTROENTEROLOGY | Facility: CLINIC | Age: 31
End: 2021-08-03

## 2021-10-11 ENCOUNTER — HEALTH MAINTENANCE LETTER (OUTPATIENT)
Age: 31
End: 2021-10-11

## 2021-10-18 ENCOUNTER — VIRTUAL VISIT (OUTPATIENT)
Dept: FAMILY MEDICINE | Facility: CLINIC | Age: 31
End: 2021-10-18
Payer: COMMERCIAL

## 2021-10-18 ENCOUNTER — TELEPHONE (OUTPATIENT)
Dept: INTERNAL MEDICINE | Facility: CLINIC | Age: 31
End: 2021-10-18

## 2021-10-18 DIAGNOSIS — F19.11 SUBSTANCE ABUSE IN REMISSION (H): ICD-10-CM

## 2021-10-18 DIAGNOSIS — Z72.0 TOBACCO ABUSE DISORDER: Primary | ICD-10-CM

## 2021-10-18 DIAGNOSIS — R61 EXCESSIVE SWEATING: ICD-10-CM

## 2021-10-18 PROCEDURE — 99214 OFFICE O/P EST MOD 30 MIN: CPT | Mod: 95 | Performed by: NURSE PRACTITIONER

## 2021-10-18 RX ORDER — VARENICLINE TARTRATE 1 MG/1
1 TABLET, FILM COATED ORAL 2 TIMES DAILY
Qty: 180 TABLET | Refills: 1 | Status: SHIPPED | OUTPATIENT
Start: 2021-10-18 | End: 2021-11-23

## 2021-10-18 NOTE — TELEPHONE ENCOUNTER
Prior Authorization Retail Medication Request    Medication/Dose: apo-varenicline 1mg  ICD code (if different than what is on RX):     Previously Tried and Failed:     Rationale:       Insurance Name:  johnie roberts  Insurance ID:  [id number]      Pharmacy Information (if different than what is on RX)  Name:     Phone:

## 2021-10-18 NOTE — PROGRESS NOTES
Ulysses is a 31 year old who is being evaluated via a billable video visit.      How would you like to obtain your AVS? MyChart  If the video visit is dropped, the invitation should be resent by: Text to cell phone: .[phone]  Will anyone else be joining your video visit? No      Telephone 7 minutes    Assessment & Plan     Tobacco abuse disorder  Has been using some chantix he got from a friend.  Was at 1mg twice a day.  Has not stopped but did decrease from 2 packs per day to 1/2 pack per day.  No side effects.  Will refill today.    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  Sweating could be due to this.      Excessive sweating  Trial drysol.  Discussed use.  If not improving may want to check some labs.   - aluminum chloride (DRYSOL) 20 % external solution; Apply topically At Bedtime      20 minutes spent on the date of the encounter doing chart review, review of test results, interpretation of tests, patient visit and documentation        Tobacco Cessation:   reports that he has been smoking. He has been smoking about 1.00 pack per day. He has never used smokeless tobacco.  Tobacco Cessation Action Plan: Pharmacotherapies : Chantix        Return in about 6 months (around 4/18/2022) for Physical Exam.    Hannah Llamas NP  [facility]   Ulysses is a 31 year old who presents for the following health issues     HPI     Wants to quit smoking and also excessive sweating under his arms. For the past 7-8 months.      She has a friend of the family they gave him a sample pack of the chantix.  Would like to try chantix.  When he was using it he was smoking less.  Is not enjoying cigarette.  Was smoking 2 packs per day now 1/2 pack per day.  Was taking 1/2mg       Started in March for a couple months and then improved.  Then started dripping under his arms.  Happened at  first with remission.  Then stopped for a few months.      History of opiate drug abuse in remission.  Has been sober two months. Thought he had hepatitis C but testing here was negative      Review of Systems   Constitutional, HEENT, cardiovascular, pulmonary, GI, , musculoskeletal, neuro, skin, endocrine and psych systems are negative, except as otherwise noted.      Objective           Vitals:  No vitals were obtained today due to virtual visit.    Physical Exam   pleasant    Telephone visit 7 minutes

## 2021-10-20 NOTE — TELEPHONE ENCOUNTER
Central Prior Authorization Team   Phone: [phone]    PA Initiation    Medication: apo-varenicline 1mg  Insurance Company: [employer] - Phone [phone] Fax [phone]  Pharmacy Filling the Rx: [address]   Filling Pharmacy Phone: [phone]  Filling Pharmacy Fax:    Start Date: 10/20/2021

## 2021-10-21 NOTE — TELEPHONE ENCOUNTER
PRIOR AUTHORIZATION DENIED    Medication: apo-varenicline 1mg    Denial Date: 10/21/2021    Denial Rational: Generic APO-Varenicline is not yet FDA approved. Brand Chantix is covered however is currently on nationwide recall.  PMAP plans through the state do not cover medications that are not FDA approved.           Appeal Information: benefit exclusion, PMAP plans through the state do not cover medications that are not FDA approved.

## 2021-10-22 ENCOUNTER — MYC MEDICAL ADVICE (OUTPATIENT)
Dept: INTERNAL MEDICINE | Facility: CLINIC | Age: 31
End: 2021-10-22

## 2021-10-22 NOTE — TELEPHONE ENCOUNTER
Medication is not FDA approved and therefore not covered. He can have a visit to discuss other options.

## 2021-10-26 NOTE — TELEPHONE ENCOUNTER
Attempted to reach patient by phone. Unable to leave voicemail due to it not being set up. Will try again later.   Yenifer Taylor MA 10/26/2021

## 2021-11-23 ENCOUNTER — OFFICE VISIT (OUTPATIENT)
Dept: INTERNAL MEDICINE | Facility: CLINIC | Age: 31
End: 2021-11-23
Payer: COMMERCIAL

## 2021-11-23 ENCOUNTER — TELEPHONE (OUTPATIENT)
Dept: INTERNAL MEDICINE | Facility: CLINIC | Age: 31
End: 2021-11-23

## 2021-11-23 VITALS
OXYGEN SATURATION: 96 % | SYSTOLIC BLOOD PRESSURE: 118 MMHG | RESPIRATION RATE: 20 BRPM | WEIGHT: 183.56 LBS | DIASTOLIC BLOOD PRESSURE: 74 MMHG | BODY MASS INDEX: 24.22 KG/M2 | HEART RATE: 99 BPM | TEMPERATURE: 98 F

## 2021-11-23 DIAGNOSIS — L74.519 FOCAL HYPERHIDROSIS: Primary | ICD-10-CM

## 2021-11-23 DIAGNOSIS — R61 EXCESSIVE SWEATING: ICD-10-CM

## 2021-11-23 LAB — TSH SERPL DL<=0.005 MIU/L-ACNC: 1.44 MU/L (ref 0.4–4)

## 2021-11-23 PROCEDURE — 99213 OFFICE O/P EST LOW 20 MIN: CPT | Performed by: INTERNAL MEDICINE

## 2021-11-23 PROCEDURE — 84443 ASSAY THYROID STIM HORMONE: CPT | Performed by: INTERNAL MEDICINE

## 2021-11-23 PROCEDURE — 36415 COLL VENOUS BLD VENIPUNCTURE: CPT | Performed by: INTERNAL MEDICINE

## 2021-11-23 RX ORDER — IBUPROFEN 600 MG/1
TABLET, FILM COATED ORAL
COMMUNITY
Start: 2021-08-19 | End: 2023-04-20

## 2021-11-23 ASSESSMENT — PAIN SCALES - GENERAL: PAINLEVEL: MILD PAIN (2)

## 2021-11-23 NOTE — TELEPHONE ENCOUNTER
Prior Authorization Retail Medication Request    Medication/Dose: Glycopyrronium Tosylate 2.4 % PADS  ICD code (if different than what is on RX):  R61  Previously Tried and Failed:    Rationale:      Insurance Name:  [employer] [facility]  Insurance ID:  [id number]      Pharmacy Information (if different than what is on RX)  Name:    Phone:

## 2021-11-23 NOTE — PROGRESS NOTES
Assessment & Plan     Excessive sweating  Patient here for excessive sweating and hyperhidrosis and axillary region.  His abdomen more since he moved to [place] last spring got better through the summer and now is worse in the winter.  He does have a history of drug use and at first thought it was when he was detoxing but now it has continued for many months afterwards.  He tried some prescription aluminum chloride antiperspirant which helped initially but now has come back.  We did review the instructions on how to use that and we will try him on some glycopyrronium topical treatment.  If that is not working he will be referred to dermatology.  We will check his TSH to make sure there is no hyperthyroidism.  He already had a work-up earlier this year for endocarditis, HIV and hepatitis C.  - Glycopyrronium Tosylate 2.4 % PADS; Externally apply 1 pad topically daily  - Adult Dermatology Referral; Future  - aluminum chloride (DRYSOL) 20 % external solution; Apply topically At Bedtime  - TSH with free T4 reflex; Future  - TSH with free T4 reflex                 No follow-ups on file.    Raymon Fajardo MD  [facility]    Dion Arora is a 31 year old who presents for the following health issues     HPI     Patient is here to discuss excessive armpit sweating    Sweating excessively.  Moved here from [place] and had done some detox from drugs last spring/summer.  Sweating was more then stopped and now came back. Worse with cold weather he says.    A month ago did a prescription anti persiprant of Drysol.      Some sweating on his feets at night, palms are ok except anxiety.     Not on any medications, quit smoking.      Otherwise feeling good..     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     Social History     Tobacco Use     Smoking status: Former Smoker     Packs/day: 1.00     Quit date: 10/23/2021     Years since quittin.0     Smokeless tobacco: Never Used   Vaping Use     Vaping Use: Never used   Substance Use Topics     Alcohol use: Not Currently     Drug use: Not Currently         Review of Systems         Objective    /74   Pulse 99   Temp 98  F (36.7  C) (Temporal)   Resp 20   Wt 83.3 kg (183 lb 9 oz)   SpO2 96%   BMI 24.22 kg/m    Body mass index is 24.22 kg/m .  Physical Exam   NAD

## 2021-11-24 NOTE — TELEPHONE ENCOUNTER
Central Prior Authorization Team   Phone: [phone]    PA Initiation    Medication: Glycopyrronium Tosylate 2.4 % PADS  Insurance Company: [facility] - Phone [phone] Fax [phone]  Pharmacy Filling the Rx: [facility] - [place], [place] - [address]  Filling Pharmacy Phone: [phone]  Filling Pharmacy Fax:    Start Date: 11/24/2021

## 2021-11-24 NOTE — TELEPHONE ENCOUNTER
Prior Authorization Approval    Authorization Effective Date: 8/24/2021  Authorization Expiration Date: 11/24/2022  Medication: Glycopyrronium Tosylate 2.4 % PADS  Approved Dose/Quantity:    Reference #:     Insurance Company: [employer] [place] - Phone [phone] Fax [phone]  Expected CoPay:       CoPay Card Available:      Foundation Assistance Needed:    Which Pharmacy is filling the prescription (Not needed for infusion/clinic administered): [facility] - [place], [place] - [address]  Pharmacy Notified: Yes  Patient Notified: Yes  **Instructed pharmacy to notify patient when script is ready to /ship.**

## 2022-04-25 ENCOUNTER — HOSPITAL ENCOUNTER (EMERGENCY)
Facility: CLINIC | Age: 32
Discharge: HOME | End: 2022-04-25
Attending: EMERGENCY MEDICINE | Admitting: EMERGENCY MEDICINE
Payer: OTHER MISCELLANEOUS

## 2022-04-25 VITALS
WEIGHT: 209 LBS | HEART RATE: 83 BPM | OXYGEN SATURATION: 96 % | RESPIRATION RATE: 18 BRPM | DIASTOLIC BLOOD PRESSURE: 88 MMHG | SYSTOLIC BLOOD PRESSURE: 144 MMHG | TEMPERATURE: 97 F | BODY MASS INDEX: 27.57 KG/M2

## 2022-04-25 DIAGNOSIS — S01.81XA LACERATION OF FOREHEAD, INITIAL ENCOUNTER: ICD-10-CM

## 2022-04-25 PROCEDURE — 99284 EMERGENCY DEPT VISIT MOD MDM: CPT | Performed by: EMERGENCY MEDICINE

## 2022-04-25 PROCEDURE — 12011 RPR F/E/E/N/L/M 2.5 CM/<: CPT | Performed by: EMERGENCY MEDICINE

## 2022-04-25 PROCEDURE — 99282 EMERGENCY DEPT VISIT SF MDM: CPT | Mod: 25 | Performed by: EMERGENCY MEDICINE

## 2022-04-25 PROCEDURE — 90471 IMMUNIZATION ADMIN: CPT | Performed by: EMERGENCY MEDICINE

## 2022-04-25 PROCEDURE — 90715 TDAP VACCINE 7 YRS/> IM: CPT | Performed by: EMERGENCY MEDICINE

## 2022-04-25 PROCEDURE — 250N000009 HC RX 250: Performed by: EMERGENCY MEDICINE

## 2022-04-25 PROCEDURE — 250N000011 HC RX IP 250 OP 636: Performed by: EMERGENCY MEDICINE

## 2022-04-25 RX ORDER — METHYLCELLULOSE 4000CPS 30 %
POWDER (GRAM) MISCELLANEOUS ONCE
Status: DISCONTINUED | OUTPATIENT
Start: 2022-04-25 | End: 2022-04-25 | Stop reason: CLARIF

## 2022-04-25 RX ADMIN — Medication 3 ML: at 22:08

## 2022-04-25 RX ADMIN — CLOSTRIDIUM TETANI TOXOID ANTIGEN (FORMALDEHYDE INACTIVATED), CORYNEBACTERIUM DIPHTHERIAE TOXOID ANTIGEN (FORMALDEHYDE INACTIVATED), BORDETELLA PERTUSSIS TOXOID ANTIGEN (GLUTARALDEHYDE INACTIVATED), BORDETELLA PERTUSSIS FILAMENTOUS HEMAGGLUTININ ANTIGEN (FORMALDEHYDE INACTIVATED), BORDETELLA PERTUSSIS PERTACTIN ANTIGEN, AND BORDETELLA PERTUSSIS FIMBRIAE 2/3 ANTIGEN 0.5 ML: 5; 2; 2.5; 5; 3; 5 INJECTION, SUSPENSION INTRAMUSCULAR at 23:10

## 2022-04-25 ASSESSMENT — ENCOUNTER SYMPTOMS
LIGHT-HEADEDNESS: 0
NECK PAIN: 0
NUMBNESS: 0
HEADACHES: 0
WOUND: 1
WEAKNESS: 0
CONFUSION: 0

## 2022-04-26 NOTE — ED PROVIDER NOTES
History     Chief Complaint   Patient presents with     Head Laceration     HPI  Ulysses Fajardo is a 32 year old male who 32-year-old male arriving today to the emergency department for evaluation of a right forehead laceration.  He was in his normal state of health at work pulling back on a large wrench when it struck him in the forehead.  He states he felt dazed but did not have any loss of consciousness he feels that his normal state of health now.  No vomiting.  He reports no unilateral change in strength or sensation vision is intact.  He noted immediate bleeding controlled with direct pressure.  No neck pain.  No other injury sustained.    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 No pertinent surgical history.    Family History:    History reviewed. No pertinent family history.    Social History:  Marital Status:  Single [1]  Social History     Tobacco Use     Smoking status: Former Smoker     Packs/day: 1.00     Quit date: 10/23/2021     Years since quittin.5     Smokeless tobacco: Never Used   Vaping Use     Vaping Use: Never used   Substance Use Topics     Alcohol use: Not Currently     Drug use: Not Currently        Medications:    aluminum chloride (DRYSOL) 20 % external solution  Glycopyrronium Tosylate 2.4 % PADS  ibuprofen (ADVIL/MOTRIN) 600 MG tablet          Review of Systems   Musculoskeletal: Negative for neck pain.   Skin: Positive for wound.   Neurological: Negative for weakness, light-headedness, numbness and headaches.   Psychiatric/Behavioral: Negative for confusion.       Physical Exam   BP: (!) 144/88  Pulse: 83  Temp: 97  F (36.1   C)  Resp: 18  Weight: 94.8 kg (209 lb)  SpO2: 96 %      Physical Exam  Vitals and nursing note reviewed.   Constitutional:       General: He is not in acute distress.     Appearance: He is not ill-appearing or diaphoretic.   HENT:      Head: Normocephalic. No raccoon eyes, contusion, right periorbital erythema or left periorbital erythema.      Comments: 2.5 cm linear laceration obliquely oriented right forehead minimal venous oozing no underlying bony tenderness or crepitus.     Right Ear: External ear normal.      Left Ear: External ear normal.   Eyes:      Pupils: Pupils are equal, round, and reactive to light.   Musculoskeletal:      Cervical back: Normal range of motion. No rigidity or tenderness.   Neurological:      General: No focal deficit present.      Mental Status: He is alert.   Psychiatric:         Mood and Affect: Mood normal.         Behavior: Behavior normal.         Cognition and Memory: Cognition and memory normal.         ED Course        Ulysses Fajardo is a 32 year old male who 32-year-old male arriving today to the emergency department for evaluation of a right forehead laceration.  On arrival to the emergency department this patient is seated upright in bed.  He appears nontoxic.  GCS 15.  He does have a 2.5 cm linear laceration over the right forehead no active bleeding at this time other than minor venous.  There is no underlying bony crepitus or depression.  Clinically has had not had any loss of consciousness nausea vomiting neurologic deficit or anticoagulation my clinical suspicion for intracranial bleed or skull fracture warranting CT imaging would be low.  I did place topical anesthesia thoroughly irrigate wound and closed primarily I discussed plan for topical bacitracin as well as monitoring for signs of infection.  Wound care discussed.         [facility]    -Laceration Repair    Date/Time: 4/25/2022 11:32 PM  Performed by: Brenden Green,  MD  Authorized by: Brenden Green MD     Risks, benefits and alternatives discussed.      ANESTHESIA (see MAR for exact dosages):     Anesthesia method:  Topical application  LACERATION DETAILS     Location:  Scalp    Scalp location:  Frontal    Length (cm):  2.5    REPAIR TYPE:     Repair type:  Simple      EXPLORATION:     Wound exploration: wound explored through full range of motion and entire depth of wound probed and visualized      Contaminated: no      TREATMENT:     Area cleansed with:  Saline    Amount of cleaning:  Standard    Irrigation solution:  Sterile water and sterile saline    Irrigation volume:  250    Irrigation method:  Syringe    Visualized foreign bodies/material removed: no      SKIN REPAIR     Repair method:  Sutures    Suture size:  5-0    Suture material:  Fast-absorbing gut    Number of sutures:  7    APPROXIMATION     Approximation:  Close    POST-PROCEDURE DETAILS     Dressing:  Antibiotic ointment        PROCEDURE    Patient Tolerance:  Patient tolerated the procedure well with no immediate complications                  No results found for this or any previous visit (from the past 24 hour(s)).    Medications - No data to display    Assessments & Plan (with Medical Decision Making)     I have reviewed the nursing notes.    I have reviewed the findings, diagnosis, plan and need for follow up with the patient.    New Prescriptions    No medications on file       Final diagnoses:   None       4/25/2022   [facility] EMERGENCY DEPT     Brenden Green MD  04/25/22 [id number]

## 2022-04-26 NOTE — ED TRIAGE NOTES
Pt presents with laceration to right forehead. Pt was at the end of his shift and was tightening something with large wrench and it hit him in the head. No loc, bleeding controlled.

## 2022-09-12 ENCOUNTER — VIRTUAL VISIT (OUTPATIENT)
Dept: FAMILY MEDICINE | Facility: CLINIC | Age: 32
End: 2022-09-12
Payer: COMMERCIAL

## 2022-09-12 DIAGNOSIS — L70.0 ACNE VULGARIS: Primary | ICD-10-CM

## 2022-09-12 DIAGNOSIS — F19.11 SUBSTANCE ABUSE IN REMISSION (H): ICD-10-CM

## 2022-09-12 PROCEDURE — 99213 OFFICE O/P EST LOW 20 MIN: CPT | Mod: 95 | Performed by: FAMILY MEDICINE

## 2022-09-12 RX ORDER — MINOCYCLINE HYDROCHLORIDE 100 MG/1
100 TABLET ORAL 2 TIMES DAILY
Qty: 180 TABLET | Refills: 0 | Status: SHIPPED | OUTPATIENT
Start: 2022-09-12 | End: 2022-10-27

## 2022-09-12 RX ORDER — MINOCYCLINE HYDROCHLORIDE 100 MG/1
100 CAPSULE ORAL 2 TIMES DAILY
Qty: 180 CAPSULE | Refills: 0 | Status: SHIPPED | OUTPATIENT
Start: 2022-09-12 | End: 2023-04-20

## 2022-09-12 NOTE — PROGRESS NOTES
Ulysses is a 32 year old who is being evaluated via a billable video visit.      How would you like to obtain your AVS? MyChart  If the video visit is dropped, the invitation should be resent by: Text to cell phone: [phone]  Will anyone else be joining your video visit? No          Assessment & Plan     Acne vulgaris  We discussed treatment options and he was given a prescription for minocycline.  When I sent the tablets it looks like it was going to be over $700, so I sent in the capsules as well.  He is going to check with his pharmacist to make sure that the cost is reasonable before he picks them up.  He will let me know if it is too expensive and he may consider doxycycline.  He was also given a referral to see a dermatologist given the extent and severity of the acne symptoms he is experiencing.  - minocycline (DYNACIN) 100 MG tablet; Take 1 tablet (100 mg) by mouth 2 times daily  - Adult Dermatology Referral; Future  - minocycline (MINOCIN) 100 MG capsule; Take 1 capsule (100 mg) by mouth 2 times daily    Substance abuse in remission  I encouraged him to remain sober.      25 minutes spent on the date of the encounter doing chart review, patient visit and documentation            Return in about 3 months (around 12/12/2022) for Follow up if symptoms not improving..    Ludwig Gaitan, [facility] [place] [facility]   Ulysses is a 32 year old, presenting for the following health issues:  Derm Problem (Severe acne on shoulders and back )      History of Present Illness       Reason for visit:  Severe acne on back and shoulders  Symptom onset:  More than a month  Symptoms include:  Blemishes and redness, some cystic acne, red swollen spots  Symptom intensity:  Severe  Symptom progression:  Improving  Had these symptoms before:  No  What makes it worse:  None  What makes it better:  Topicals are helping some    He eats 0-1 servings of fruits and vegetables daily.He consumes 1  sweetened beverage(s) daily.He exercises with enough effort to increase his heart rate 30 to 60 minutes per day.  He exercises with enough effort to increase his heart rate 4 days per week. He is missing 7 dose(s) of medications per week.  He is not taking prescribed medications regularly due to other.             He reports having severe acne symptoms primarily on his back, chest and arms over the past year.  He has been using over-the-counter topical treatments which provide some relief, but he still has significant acne outbreaks.  He describes the acne is cystic with lots of whiteheads.  He saw dermatologist when he was younger (teenager) and was on doxycycline in the past.  He has a history of substance abuse and reports being sober for the past 18 months.    Review of Systems   Constitutional, HEENT, cardiovascular, pulmonary, GI, , musculoskeletal, neuro, skin, endocrine and psych systems are negative, except as otherwise noted.      Objective    Vitals - Patient Reported  Pain Score: No Pain (0)      Vitals:  No vitals were obtained today due to virtual visit.    Physical Exam   GENERAL: Healthy, alert and no distress  EYES: Eyes grossly normal to inspection.  No discharge or erythema, or obvious scleral/conjunctival abnormalities.  RESP: No audible wheeze, cough, or visible cyanosis.  No visible retractions or increased work of breathing.    SKIN: There are several papules and pustules across his upper chest wall, upper arms and throughout his back.  NEURO: Cranial nerves grossly intact.  Mentation and speech appropriate for age.  PSYCH: Mentation appears normal, affect normal/bright, judgement and insight intact, normal speech and appearance well-groomed.                Video-Visit Details    Video Start Time: 1:01 PM    Type of service:  Video Visit    Video End Time:1:18 PM    Originating Location (pt. Location): Home    Distant Location (provider location):  [facility]      Platform used for Video Visit: Eunice

## 2022-09-25 ENCOUNTER — HEALTH MAINTENANCE LETTER (OUTPATIENT)
Age: 32
End: 2022-09-25

## 2022-10-20 ENCOUNTER — MYC MEDICAL ADVICE (OUTPATIENT)
Dept: FAMILY MEDICINE | Facility: CLINIC | Age: 32
End: 2022-10-20

## 2022-10-20 ENCOUNTER — VIRTUAL VISIT (OUTPATIENT)
Dept: FAMILY MEDICINE | Facility: CLINIC | Age: 32
End: 2022-10-20
Payer: COMMERCIAL

## 2022-10-20 DIAGNOSIS — F41.1 GAD (GENERALIZED ANXIETY DISORDER): ICD-10-CM

## 2022-10-20 DIAGNOSIS — F90.0 ATTENTION DEFICIT HYPERACTIVITY DISORDER (ADHD), PREDOMINANTLY INATTENTIVE TYPE: Primary | ICD-10-CM

## 2022-10-20 PROCEDURE — 99214 OFFICE O/P EST MOD 30 MIN: CPT | Mod: 95 | Performed by: PHYSICIAN ASSISTANT

## 2022-10-20 RX ORDER — LISDEXAMFETAMINE DIMESYLATE 40 MG/1
40 CAPSULE ORAL EVERY MORNING
Qty: 30 CAPSULE | Refills: 0 | Status: SHIPPED | OUTPATIENT
Start: 2022-10-20 | End: 2023-04-20

## 2022-10-20 NOTE — PROGRESS NOTES
"Ulysses is a 32 year old who is being evaluated via a billable video visit.      How would you like to obtain your AVS? MyChart  If the video visit is dropped, the invitation should be resent by: Text to cell phone: [phone]  Will anyone else be joining your video visit? No          Assessment & Plan   Problem List Items Addressed This Visit    None  Visit Diagnoses     Attention deficit hyperactivity disorder (ADHD), predominantly inattentive type    -  Primary    Relevant Medications    lisdexamfetamine (VYVANSE) 40 MG capsule    HECTOR (generalized anxiety disorder)               PLAN - start with Vyvanse mid-range dose for 4 weeks, then recheck.   Patient has history of ADHD as a child and adult, had improvement with Vyvanse as an adult.    Symptoms are consistent with inattentiveness   Recheck anxiety after trial of Vyvanse to see if sx improve.                  Return in about 4 weeks (around 11/17/2022) for ADHD Recheck, Medication Recheck.    Willa Mullins PA-C  [facility]    Dion   Ulysses is a 32 year old, presenting for the following health issues:  No chief complaint on file.      HPI     Concern - Trouble Focusing / ADHD      Inattentive - school and work affected  Operates heavy machinery at work, very forgetful, hard to focus  Trouble articulating thoughts into words  Racing thoughts and anxiety  Noticed poor memory  Forgets to complete tasks at work, has to write things down  Making mistakes at work, concern for safety  Really noticed when he was in school taking a test - what he was reading he could not focus on it  Diagnosis with ADHD as a kid - prescribed Ritalin as a kid, however made him \"like a zombie\"  Vyvanse several years ago - helped for a while, but he wanted to see if he could be successful without medication    Anxiety history - he previously took hydroxyzine as needed  Social anxiety          Review of Systems         Objective     "       Vitals:  No vitals were obtained today due to virtual visit.    Physical Exam   GENERAL: Healthy, alert and no distress  EYES: Eyes grossly normal to inspection.  No discharge or erythema, or obvious scleral/conjunctival abnormalities.  RESP: No audible wheeze, cough, or visible cyanosis.  No visible retractions or increased work of breathing.    SKIN: Visible skin clear. No significant rash, abnormal pigmentation or lesions.  NEURO: Cranial nerves grossly intact.  Mentation and speech appropriate for age.  PSYCH: Mentation appears normal, affect normal/bright, judgement and insight intact, normal speech and appearance well-groomed.                Video-Visit Details    Video Start Time: 11:30    Type of service:  Video Visit    Video End Time:11:42    Originating Location (pt. Location): Home        Distant Location (provider location):  Off-site    Platform used for Video Visit: ZaidaWell

## 2022-10-24 ENCOUNTER — MYC MEDICAL ADVICE (OUTPATIENT)
Dept: FAMILY MEDICINE | Facility: CLINIC | Age: 32
End: 2022-10-24

## 2022-10-24 NOTE — TELEPHONE ENCOUNTER
Please see [employer] message and advise.     Yuki Solomon RN  [facility] Triage Team

## 2022-10-25 RX ORDER — DEXTROAMPHETAMINE SACCHARATE, AMPHETAMINE ASPARTATE MONOHYDRATE, DEXTROAMPHETAMINE SULFATE AND AMPHETAMINE SULFATE 5; 5; 5; 5 MG/1; MG/1; MG/1; MG/1
20 CAPSULE, EXTENDED RELEASE ORAL DAILY
Qty: 30 CAPSULE | Refills: 0 | Status: SHIPPED | OUTPATIENT
Start: 2022-10-25 | End: 2022-10-27

## 2022-10-25 NOTE — TELEPHONE ENCOUNTER
Please see [employer] message and advise.     Yuki Solomon RN  [facility] Triage Team

## 2022-11-16 ENCOUNTER — MYC MEDICAL ADVICE (OUTPATIENT)
Dept: FAMILY MEDICINE | Facility: CLINIC | Age: 32
End: 2022-11-16

## 2022-11-16 DIAGNOSIS — F90.0 ATTENTION DEFICIT HYPERACTIVITY DISORDER (ADHD), PREDOMINANTLY INATTENTIVE TYPE: ICD-10-CM

## 2022-11-18 ENCOUNTER — TELEPHONE (OUTPATIENT)
Dept: INTERNAL MEDICINE | Facility: CLINIC | Age: 32
End: 2022-11-18

## 2022-11-21 NOTE — TELEPHONE ENCOUNTER
Please see MyChart message and advise.     Pharmacy is pended. Medication is not on patient's current medication list. (It was discontinued on 10/27 for medication reconciliation clean up.) Do you fill comfortable filling this medication?    Y[facility] Michaels Triage Team

## 2022-11-21 NOTE — TELEPHONE ENCOUNTER
Prior Authorization Not Needed   Medication: Glycopyrronium Tosylate 2.4 % PADS - RENEWAL-DIFFERENT INSURANCE  Insurance Company: [employer] - Phone [phone] Fax [phone]  Expected CoPay:      Pharmacy Filling the Rx: [facility] - [place], [place] - [address] [facility]  Pharmacy Notified: Yes  Patient Notified: No    Patient now has different insurance, PA not needed.

## 2022-11-22 RX ORDER — DEXTROAMPHETAMINE SACCHARATE, AMPHETAMINE ASPARTATE MONOHYDRATE, DEXTROAMPHETAMINE SULFATE AND AMPHETAMINE SULFATE 5; 5; 5; 5 MG/1; MG/1; MG/1; MG/1
20 CAPSULE, EXTENDED RELEASE ORAL DAILY
Qty: 30 CAPSULE | Refills: 0 | Status: SHIPPED | OUTPATIENT
Start: 2022-11-22 | End: 2022-11-22

## 2022-11-22 RX ORDER — DEXTROAMPHETAMINE SACCHARATE, AMPHETAMINE ASPARTATE MONOHYDRATE, DEXTROAMPHETAMINE SULFATE AND AMPHETAMINE SULFATE 5; 5; 5; 5 MG/1; MG/1; MG/1; MG/1
20 CAPSULE, EXTENDED RELEASE ORAL DAILY
Qty: 30 CAPSULE | Refills: 0 | Status: SHIPPED | OUTPATIENT
Start: 2022-11-22 | End: 2023-04-19

## 2023-01-01 ENCOUNTER — VIRTUAL VISIT (OUTPATIENT)
Dept: FAMILY MEDICINE | Facility: CLINIC | Age: 33
End: 2023-01-01
Payer: COMMERCIAL

## 2023-01-01 ENCOUNTER — HEALTH MAINTENANCE LETTER (OUTPATIENT)
Age: 33
End: 2023-01-01

## 2023-01-01 ENCOUNTER — TELEPHONE (OUTPATIENT)
Dept: INTERNAL MEDICINE | Facility: CLINIC | Age: 33
End: 2023-01-01

## 2023-01-01 DIAGNOSIS — M54.41 ACUTE BILATERAL LOW BACK PAIN WITH RIGHT-SIDED SCIATICA: Primary | ICD-10-CM

## 2023-01-01 PROCEDURE — 99213 OFFICE O/P EST LOW 20 MIN: CPT | Mod: VID | Performed by: PHYSICIAN ASSISTANT

## 2023-01-01 RX ORDER — METHOCARBAMOL 500 MG/1
500 TABLET, FILM COATED ORAL 3 TIMES DAILY PRN
Qty: 45 TABLET | Refills: 1 | Status: SHIPPED | OUTPATIENT
Start: 2023-01-01

## 2023-01-01 RX ORDER — PREDNISONE 20 MG/1
40 TABLET ORAL DAILY
Qty: 10 TABLET | Refills: 0 | Status: SHIPPED | OUTPATIENT
Start: 2023-01-01 | End: 2023-01-01

## 2023-01-01 RX ORDER — NAPROXEN 500 MG/1
500 TABLET ORAL 2 TIMES DAILY WITH MEALS
Qty: 60 TABLET | Refills: 0 | Status: SHIPPED | OUTPATIENT
Start: 2023-01-01

## 2023-01-01 ASSESSMENT — ENCOUNTER SYMPTOMS: BACK PAIN: 1

## 2023-04-19 PROBLEM — Z86.19 HX OF HEPATITIS C: Status: ACTIVE | Noted: 2023-04-19

## 2023-04-20 ENCOUNTER — VIRTUAL VISIT (OUTPATIENT)
Dept: FAMILY MEDICINE | Facility: CLINIC | Age: 33
End: 2023-04-20
Payer: COMMERCIAL

## 2023-04-20 ENCOUNTER — TELEPHONE (OUTPATIENT)
Dept: FAMILY MEDICINE | Facility: CLINIC | Age: 33
End: 2023-04-20

## 2023-04-20 DIAGNOSIS — F90.9 ATTENTION DEFICIT HYPERACTIVITY DISORDER (ADHD), UNSPECIFIED ADHD TYPE: ICD-10-CM

## 2023-04-20 DIAGNOSIS — F19.20 SUBSTANCE DEPENDENCE (H): Primary | ICD-10-CM

## 2023-04-20 DIAGNOSIS — F11.11 OPIOID USE DISORDER, MILD, IN EARLY REMISSION (H): ICD-10-CM

## 2023-04-20 PROCEDURE — 99215 OFFICE O/P EST HI 40 MIN: CPT | Mod: GT | Performed by: FAMILY MEDICINE

## 2023-04-20 RX ORDER — CLONIDINE HYDROCHLORIDE 0.1 MG/1
0.1 TABLET ORAL 3 TIMES DAILY PRN
Qty: 45 TABLET | Refills: 0 | Status: SHIPPED | OUTPATIENT
Start: 2023-04-20 | End: 2023-05-22

## 2023-04-20 RX ORDER — HYDROXYZINE PAMOATE 50 MG/1
50 CAPSULE ORAL 4 TIMES DAILY
Qty: 60 CAPSULE | Refills: 0 | Status: SHIPPED | OUTPATIENT
Start: 2023-04-20 | End: 2023-05-05

## 2023-04-20 NOTE — TELEPHONE ENCOUNTER
Have never seen this patient in past.  Patient no showed our appt on 4/19/23.   Prior completion of PHQ revealing max score of 27.   Received alert via inbox.  Patient did have appt today with a different provider but not sure if it was addressed.    Will route to triage to help assess.     Bryan Pena MD  [facility]  4/20/2023

## 2023-04-20 NOTE — PROGRESS NOTES
Ulysses is a 33 year old who is being evaluated via a billable video visit.      How would you like to obtain your AVS? MyChart  If the video visit is dropped, the invitation should be resent by: Text to cell phone: [phone]  Will anyone else be joining your video visit? No          Assessment & Plan     ASSESSMENT/ORDERS:    ICD-10-CM    1. Substance dependence (H)  F19.20 hydrOXYzine (VISTARIL) 50 MG capsule     cloNIDine (CATAPRES) 0.1 MG tablet     Adult Mental Health  Referral      2. Opioid use disorder, mild, in early remission (H)  F11.11 Adult Mental Health  Referral      3. Attention deficit hyperactivity disorder (ADHD), unspecified ADHD type  F90.9 Adult Mental Health  Referral        PLAN:  1.  Discussed Rufinotom with addiction medicine colleague halie he reassured me that patient should not have fatal withdrawal effects from this substance as he is not aware of this occurring and neither did I in my time revieweing literature on this subject.  Patient knows that from previous opioid withdrawls that he can typically take 1-2 weeks to feel fully better and has had past exerpience with clonodine for withdrawal symptoms.  Will use this as a three times daily as needed along with scheduled hydroxyzine four times daily for the next 2 weeks.  Letter for work for 2 weeks CECELIA written and follow-up with me at that time for further review and return to work plan.  2.  Referral to collaborative psychiatry for discussion on ADHD versus other mental health disorder and history of substance abuse.  Patient is concerned about Adderall addiction or other addictive medications for ADHD treatment.     Follow-up Visit   Expected date:  May 04, 2023 (Approximate)      Follow Up Appointment Details:     Follow-up with whom?: Me    Follow-Up for what?: Chronic Disease f/u    Chronic Disease f/u: General (Other)    Additional Details: substance withdrawl monitoring    How?: Video    Is this  an as-needed follow-up?: No                        54 minutes spent by me on the date of the encounter doing chart review, patient visit, documentation and discussion with other provider(s)            Ulysses Caballero MD  [facility] [place]    Dion Arora is a 33 year old, presenting for the following health issues:  Recheck Medication (Has been taking craetom, becoming physically dependent on it. Tried to start taking it as a natural route for ADHD. Everytime he stops taking he gets sick. Has a history of addiction. Feels very similar to opiod withdrawl)        4/20/2023     8:16 AM   Additional Questions   Roomed by [facility]   Accompanied by N/A     Forms 4/20/2023   Any forms needing to be completed Yes         4/20/2023     8:16 AM   Patient Reported Additional Medications   Patient reports taking the following new medications Craetom powder - 50g mix with water 3 times daily     HPI         Taking Kratom powder as noted above. Was prescribed Adderall for ADHD and he did not like it, wanted to try something more natural and a coworker told him Kratom.    He is now having bad anxiety, he is feeling addicted to it    History of opioid addiciton, 2 years sober. Does not want medication to treatment Kratom addiction, scared to be on suboxone for kratom addiction as he does not want to get readdicted to opioids with his 2 years sobriety.    He is concerned about withdrawal symptoms and problems with working while experiencing them.    Review of Systems         Objective           Vitals:  No vitals were obtained today due to virtual visit.    Physical Exam   GENERAL: Healthy, alert and no distress  EYES: Eyes grossly normal to inspection.  No discharge or erythema, or obvious scleral/conjunctival abnormalities.  RESP: No audible wheeze, cough, or visible cyanosis.  No visible retractions or increased work of breathing.    SKIN: Visible skin clear. No significant rash, abnormal  pigmentation or lesions.  NEURO: Cranial nerves grossly intact.  Mentation and speech appropriate for age.  PSYCH: Mentation appears normal, affect normal/bright, judgement and insight intact, normal speech and appearance well-groomed.                Video-Visit Details    Type of service:  Video Visit   Start time:  9:58 AM   End time:  10:45 AM   Originating Location (pt. Location): Home    Distant Location (provider location):  On-site  Platform used for Video Visit: Eunice

## 2023-04-20 NOTE — LETTER
April 20, 2023    To Whom it may Concern:    Ulysses Fajardo was seen today in clinic via virtual visit.  He is going to require a 2 week leave of absence for medical reasons and he will be reassessed for return to work in 2 weeks.  At that time, we will determine when his official return to work date will be, but it may be as soon as 5/5/2023.    If you have any further questions, please contact me at the number above.    Sincerely,        Ulysses Caballero MD

## 2023-04-21 ENCOUNTER — TELEPHONE (OUTPATIENT)
Dept: BEHAVIORAL HEALTH | Facility: CLINIC | Age: 33
End: 2023-04-21
Payer: COMMERCIAL

## 2023-04-21 NOTE — TELEPHONE ENCOUNTER
[facility] PH[facility]-9 Follow-up  Behavioral Health Clinician Triage Service    [facility] PHQ-9 Responses:      4/19/2023     8:54 AM   [facility] Follow-up to PHQ   PHQ-9 9. Suicide Ideation past 2 weeks Nearly every day   Thoughts of suicide or self harm in past 2 weeks No   Thoughts of suicide or self harm in past 2 weeks No   PHQ-9 Safety concerns? No   PHQ-9 Safety concerns? No        1st Outreach Date: April 21, 2023 Time: 905am  Outcome: No Answer.  [facility] Pool will make one more phone attempt within 24 hours.   Pt's voice mail is not set up. [facility] sent My Chart message asking pt to call.   [facility]    2nd Outreach Date: April 21, 2023 Time: 147pm  Outcome: [facility] called again. No answer. Pt has not read my chart message.  [facility] has enlisted a colleague to do follow up as she will be out for the remainder of the day.   [facility]    3rd Outreach Date: April 21, 2023 Time: 4:33 PM  Outcome: No response, unable to leave VM. See disposition.       Disposition:  Unable to reach patient. Chart review does not indicate history of active suicidal ideation or attempt, and patient's PHQ-9 responses indicate he is not experiencing active suicidal ideation and does not have concerns for his safety. He saw a provider on 4/20, one day after his no-show appointment that triggered the BPA, and addressed the concerns he had planned to discuss in the appointment that was missed on 4/19. This writer communicated with the provider who patient did see on 4/20 - this provider expressed understanding that patient's PHQ-9 responses were associated with substance use related concerns which were discussed. The provider who met with patient on 4/20 co-developed a plan to support kratom cessation, indicating future-oriented thought process in patient. Patient is scheduled to follow up with the provider on 5/4. Per these factors, a welfare check is not indicated at this time. [facility] will send crisis resources via [facility].

## 2023-04-27 NOTE — TELEPHONE ENCOUNTER
Appointments in Next Year      May 04, 2023  9:40 AM  (Arrive by 9:20 AM)  Provider Visit with Ulysses Caballero MD  [facility] ([facility] ) [phone]          Patient also on behavioral health radar, has been outreached by them a few times now.

## 2023-05-11 ENCOUNTER — MYC MEDICAL ADVICE (OUTPATIENT)
Dept: FAMILY MEDICINE | Facility: CLINIC | Age: 33
End: 2023-05-11
Payer: COMMERCIAL

## 2023-05-12 ENCOUNTER — HOSPITAL ENCOUNTER (EMERGENCY)
Facility: CLINIC | Age: 33
Discharge: HOME | End: 2023-05-12
Attending: EMERGENCY MEDICINE | Admitting: EMERGENCY MEDICINE
Payer: COMMERCIAL

## 2023-05-12 VITALS
SYSTOLIC BLOOD PRESSURE: 109 MMHG | DIASTOLIC BLOOD PRESSURE: 66 MMHG | RESPIRATION RATE: 20 BRPM | TEMPERATURE: 98.2 F | WEIGHT: 170 LBS | HEART RATE: 103 BPM | OXYGEN SATURATION: 99 % | BODY MASS INDEX: 22.43 KG/M2

## 2023-05-12 DIAGNOSIS — F11.23 OPIOID DEPENDENCE WITH WITHDRAWAL (H): ICD-10-CM

## 2023-05-12 LAB
ALBUMIN SERPL BCG-MCNC: 4 G/DL (ref 3.5–5.2)
ALP SERPL-CCNC: 63 U/L (ref 40–129)
ALT SERPL W P-5'-P-CCNC: <5 U/L (ref 10–50)
AMPHETAMINES UR QL: NOT DETECTED
ANION GAP SERPL CALCULATED.3IONS-SCNC: 16 MMOL/L (ref 7–15)
AST SERPL W P-5'-P-CCNC: 29 U/L (ref 10–50)
BARBITURATES UR QL SCN: NOT DETECTED
BASOPHILS # BLD AUTO: 0.1 10E3/UL (ref 0–0.2)
BASOPHILS NFR BLD AUTO: 1 %
BENZODIAZ UR QL SCN: NOT DETECTED
BILIRUB SERPL-MCNC: 0.4 MG/DL
BUN SERPL-MCNC: 14.7 MG/DL (ref 6–20)
BUPRENORPHINE UR QL: DETECTED
CALCIUM SERPL-MCNC: 9.8 MG/DL (ref 8.6–10)
CANNABINOIDS UR QL: DETECTED
CHLORIDE SERPL-SCNC: 105 MMOL/L (ref 98–107)
COCAINE UR QL SCN: NOT DETECTED
CREAT SERPL-MCNC: 0.73 MG/DL (ref 0.67–1.17)
D-METHAMPHET UR QL: NOT DETECTED
DEPRECATED HCO3 PLAS-SCNC: 22 MMOL/L (ref 22–29)
EOSINOPHIL # BLD AUTO: 0 10E3/UL (ref 0–0.7)
EOSINOPHIL NFR BLD AUTO: 0 %
ERYTHROCYTE [DISTWIDTH] IN BLOOD BY AUTOMATED COUNT: 11.9 % (ref 10–15)
ETHANOL SERPL-MCNC: <0.01 G/DL
GFR SERPL CREATININE-BSD FRML MDRD: >90 ML/MIN/1.73M2
GLUCOSE SERPL-MCNC: 109 MG/DL (ref 70–99)
HCT VFR BLD AUTO: 42.4 % (ref 40–53)
HGB BLD-MCNC: 14.8 G/DL (ref 13.3–17.7)
IMM GRANULOCYTES # BLD: 0.1 10E3/UL
IMM GRANULOCYTES NFR BLD: 1 %
LYMPHOCYTES # BLD AUTO: 0.8 10E3/UL (ref 0.8–5.3)
LYMPHOCYTES NFR BLD AUTO: 12 %
MCH RBC QN AUTO: 28.5 PG (ref 26.5–33)
MCHC RBC AUTO-ENTMCNC: 34.9 G/DL (ref 31.5–36.5)
MCV RBC AUTO: 82 FL (ref 78–100)
METHADONE UR QL SCN: NOT DETECTED
MONOCYTES # BLD AUTO: 0.3 10E3/UL (ref 0–1.3)
MONOCYTES NFR BLD AUTO: 4 %
NEUTROPHILS # BLD AUTO: 5.9 10E3/UL (ref 1.6–8.3)
NEUTROPHILS NFR BLD AUTO: 82 %
NRBC # BLD AUTO: 0 10E3/UL
NRBC BLD AUTO-RTO: 0 /100
OPIATES UR QL SCN: NOT DETECTED
OXYCODONE UR QL SCN: NOT DETECTED
PCP UR QL SCN: NOT DETECTED
PLATELET # BLD AUTO: 573 10E3/UL (ref 150–450)
POTASSIUM SERPL-SCNC: 5.4 MMOL/L (ref 3.4–5.3)
PROPOXYPH UR QL: NOT DETECTED
PROT SERPL-MCNC: 7 G/DL (ref 6.4–8.3)
RBC # BLD AUTO: 5.2 10E6/UL (ref 4.4–5.9)
SODIUM SERPL-SCNC: 143 MMOL/L (ref 136–145)
TRICYCLICS UR QL SCN: DETECTED
WBC # BLD AUTO: 7.2 10E3/UL (ref 4–11)

## 2023-05-12 PROCEDURE — 99285 EMERGENCY DEPT VISIT HI MDM: CPT | Mod: 25

## 2023-05-12 PROCEDURE — 250N000011 HC RX IP 250 OP 636: Performed by: EMERGENCY MEDICINE

## 2023-05-12 PROCEDURE — 96374 THER/PROPH/DIAG INJ IV PUSH: CPT

## 2023-05-12 PROCEDURE — 85025 COMPLETE CBC W/AUTO DIFF WBC: CPT | Performed by: EMERGENCY MEDICINE

## 2023-05-12 PROCEDURE — 82077 ASSAY SPEC XCP UR&BREATH IA: CPT | Performed by: EMERGENCY MEDICINE

## 2023-05-12 PROCEDURE — 36416 COLLJ CAPILLARY BLOOD SPEC: CPT | Performed by: EMERGENCY MEDICINE

## 2023-05-12 PROCEDURE — 93005 ELECTROCARDIOGRAM TRACING: CPT

## 2023-05-12 PROCEDURE — 250N000013 HC RX MED GY IP 250 OP 250 PS 637: Performed by: EMERGENCY MEDICINE

## 2023-05-12 PROCEDURE — 80306 DRUG TEST PRSMV INSTRMNT: CPT | Performed by: EMERGENCY MEDICINE

## 2023-05-12 PROCEDURE — 93010 ELECTROCARDIOGRAM REPORT: CPT | Performed by: EMERGENCY MEDICINE

## 2023-05-12 PROCEDURE — 80053 COMPREHEN METABOLIC PANEL: CPT | Performed by: EMERGENCY MEDICINE

## 2023-05-12 PROCEDURE — 258N000003 HC RX IP 258 OP 636: Performed by: EMERGENCY MEDICINE

## 2023-05-12 PROCEDURE — 99285 EMERGENCY DEPT VISIT HI MDM: CPT | Performed by: EMERGENCY MEDICINE

## 2023-05-12 PROCEDURE — 96375 TX/PRO/DX INJ NEW DRUG ADDON: CPT

## 2023-05-12 PROCEDURE — 96361 HYDRATE IV INFUSION ADD-ON: CPT

## 2023-05-12 RX ORDER — CLONIDINE HYDROCHLORIDE 0.1 MG/1
0.2 TABLET ORAL ONCE
Status: COMPLETED | OUTPATIENT
Start: 2023-05-12 | End: 2023-05-12

## 2023-05-12 RX ORDER — ONDANSETRON 2 MG/ML
4 INJECTION INTRAMUSCULAR; INTRAVENOUS EVERY 30 MIN PRN
Status: DISCONTINUED | OUTPATIENT
Start: 2023-05-12 | End: 2023-05-12 | Stop reason: HOSPADM

## 2023-05-12 RX ORDER — SODIUM CHLORIDE, SODIUM LACTATE, POTASSIUM CHLORIDE, CALCIUM CHLORIDE 600; 310; 30; 20 MG/100ML; MG/100ML; MG/100ML; MG/100ML
INJECTION, SOLUTION INTRAVENOUS CONTINUOUS
Status: DISCONTINUED | OUTPATIENT
Start: 2023-05-12 | End: 2023-05-12 | Stop reason: HOSPADM

## 2023-05-12 RX ORDER — LORAZEPAM 2 MG/ML
1 INJECTION INTRAMUSCULAR ONCE
Status: COMPLETED | OUTPATIENT
Start: 2023-05-12 | End: 2023-05-12

## 2023-05-12 RX ADMIN — CLONIDINE HYDROCHLORIDE 0.1 MG: 0.1 TABLET ORAL at 13:44

## 2023-05-12 RX ADMIN — LORAZEPAM 1 MG: 2 INJECTION INTRAMUSCULAR; INTRAVENOUS at 14:07

## 2023-05-12 RX ADMIN — SODIUM CHLORIDE, POTASSIUM CHLORIDE, SODIUM LACTATE AND CALCIUM CHLORIDE 1000 ML: 600; 310; 30; 20 INJECTION, SOLUTION INTRAVENOUS at 13:41

## 2023-05-12 RX ADMIN — ONDANSETRON 4 MG: 2 INJECTION INTRAMUSCULAR; INTRAVENOUS at 13:42

## 2023-05-12 ASSESSMENT — ACTIVITIES OF DAILY LIVING (ADL)
ADLS_ACUITY_SCORE: 35

## 2023-05-12 NOTE — CONSULTS
Care Management:    Consult received for assistance with Detox placement for patient.  Discussed plan with provider, Dr Granger.    Per Dr Granger, patient has already contacted [facility] in [place].      SW called [facility] [phone] to follow up.  Spoke with August.  August states they are full for admission on his shift, however, he states there will be beds open on the next shift.  He states they take admissions 24/7.  He anticipates a bed available 9PM or after.  Provided August with the direct number to the ED to call when bed opens.     If ED staff has not heard back from [facility] by 9PM, call them to check on status of beds at [phone].    Updated Dr. Granger, provider in the ED.    ANTHONY Dennis  [facility] [phone]/ [place] [phone]  Care Management

## 2023-05-12 NOTE — TELEPHONE ENCOUNTER
I can fill this out.  Has there been any changes from the letter witting on 4/20?  If not, no appointment needed before his follow-up appointment already scheduled.  If he is needing or needed more time off than what was no the letter, then we should discuss FMLA papers over a vitual visit.    Will have staff notify patient.    Ulysses Caballero MD

## 2023-05-12 NOTE — ED PROVIDER NOTES
History     Chief Complaint   Patient presents with     Withdrawal     HPI  Ulysses Fajardo is a 33 year old male who presents wanting help with fentanyl withdrawal.  He reports relapse approximately 3 months ago.  He has been using fentanyl approximately every 6 hours either smoking or injecting into bilateral hands.  No signs of infection.  He was a significant heroin user from age [age] and was sober for 2 years he reports.  His sister has been in contact with the [facility] detox facility in [place] to potentially get him in there.  She also has him set up for a rule 25 on Tuesday in 4 days.  He is having significant shakes and has not eaten in 4 days.    Allergies:  No Known Allergies    Problem List:    Patient Active Problem List    Diagnosis Date Noted     Opioid use disorder, mild, in early remission (H) 2023     Priority: Medium     Attention deficit hyperactivity disorder (ADHD), unspecified ADHD type 2023     Priority: Medium     Hx of hepatitis C 2023     Priority: Medium     2/2 IVDU.   RNA quant negative.       Substance abuse in remission (H)      Priority: Medium        Past Medical History:    Past Medical History:   Diagnosis Date     History of intravenous drug use in remission      MRSA infection      Substance abuse in remission (H)        Past Surgical History:    No past surgical history on file.    Family History:    No family history on file.    Social History:  Marital Status:  Single [1]  Social History     Tobacco Use     Smoking status: Former     Packs/day: 1.00     Types: Cigarettes     Quit date: 10/23/2021     Years since quittin.5     Smokeless tobacco: Never   Vaping Use     Vaping status: Every Day   Substance Use Topics     Alcohol use: Not Currently     Drug use: Yes     Types: Fentanyl        Medications:    cloNIDine (CATAPRES) 0.1 MG tablet          Review of Systems  All other systems are reviewed and are negative    Physical Exam   BP: (!)  138/90  Pulse: 93  Temp: 98.2  F (36.8  C)  Resp: 18  Weight: 77.1 kg (170 lb)  SpO2: 99 %      Physical Exam  Vitals and nursing note reviewed.   Constitutional:       Appearance: He is well-developed. He is diaphoretic.      Comments: Disheveled in rough shape.  Talking in short sentences, tremulous.   HENT:      Head: Normocephalic and atraumatic.      Mouth/Throat:      Mouth: Mucous membranes are dry.   Eyes:      General: No scleral icterus.        Right eye: No discharge.         Left eye: No discharge.      Conjunctiva/sclera: Conjunctivae normal.   Cardiovascular:      Rate and Rhythm: Normal rate and regular rhythm.      Heart sounds: Normal heart sounds. No murmur heard.  Pulmonary:      Effort: Pulmonary effort is normal. No respiratory distress.      Breath sounds: Normal breath sounds. No stridor.   Abdominal:      Palpations: Abdomen is soft.      Tenderness: There is no abdominal tenderness.   Musculoskeletal:         General: Normal range of motion.      Cervical back: Normal range of motion and neck supple.      Comments: Injection sites in both hands do not show any signs of infection   Skin:     General: Skin is warm.      Coloration: Skin is not pale.      Findings: No erythema or rash.   Neurological:      General: No focal deficit present.      Cranial Nerves: No cranial nerve deficit.      Motor: No abnormal muscle tone.   Psychiatric:         Mood and Affect: Mood is anxious.         ED Course                 Procedures         EKG reveals sinus rhythm at 90 bpm.  No acute ST segment or T wave changes noted.  Interpreted by myself             Results for orders placed or performed during the hospital encounter of 05/12/23 (from the past 24 hour(s))   Urine Drugs of Abuse Screen    Narrative    The following orders were created for panel order Urine Drugs of Abuse Screen.  Procedure                               Abnormality         Status                     ---------                                -----------         ------                     Urine Drugs of Abuse Scr...[[id number]]  Abnormal            Final result                 Please view results for these tests on the individual orders.   Urine Drugs of Abuse Screen Panel 13   Result Value Ref Range    Cannabinoids (02-wir-6-carboxy-9-THC) Detected (A) Not Detected, Indeterminate    Phencyclidine Not Detected Not Detected, Indeterminate    Cocaine (Benzoylecgonine) Not Detected Not Detected, Indeterminate    Methamphetamine (d-Methamphetamine) Not Detected Not Detected, Indeterminate    Opiates (Morphine) Not Detected Not Detected, Indeterminate    Amphetamine (d-Amphetamine) Not Detected Not Detected, Indeterminate    Benzodiazepines (Nordiazepam) Not Detected Not Detected, Indeterminate    Tricyclic Antidepressants (Desipramine) Detected (A) Not Detected, Indeterminate    Methadone Not Detected Not Detected, Indeterminate    Barbiturates (Butalbital) Not Detected Not Detected, Indeterminate    Oxycodone Not Detected Not Detected, Indeterminate    Propoxyphene (Norpropoxyphene) Not Detected Not Detected, Indeterminate    Buprenorphine Detected (A) Not Detected, Indeterminate   CBC with platelets differential    Narrative    The following orders were created for panel order CBC with platelets differential.  Procedure                               Abnormality         Status                     ---------                               -----------         ------                     CBC with platelets and d...[[id number]]  Abnormal            Final result                 Please view results for these tests on the individual orders.   Comprehensive metabolic panel   Result Value Ref Range    Sodium 143 136 - 145 mmol/L    Potassium 5.4 (H) 3.4 - 5.3 mmol/L    Chloride 105 98 - 107 mmol/L    Carbon Dioxide (CO2) 22 22 - 29 mmol/L    Anion Gap 16 (H) 7 - 15 mmol/L    Urea Nitrogen 14.7 6.0 - 20.0 mg/dL    Creatinine 0.73 0.67 - 1.17 mg/dL    Calcium 9.8 8.6  - 10.0 mg/dL    Glucose 109 (H) 70 - 99 mg/dL    Alkaline Phosphatase 63 40 - 129 U/L    AST 29 10 - 50 U/L    ALT <5 (L) 10 - 50 U/L    Protein Total 7.0 6.4 - 8.3 g/dL    Albumin 4.0 3.5 - 5.2 g/dL    Bilirubin Total 0.4 <=1.2 mg/dL    GFR Estimate >90 >60 mL/min/1.73m2   Alcohol ethyl   Result Value Ref Range    Alcohol ethyl <0.01 <=0.01 g/dL   CBC with platelets and differential   Result Value Ref Range    WBC Count 7.2 4.0 - 11.0 10e3/uL    RBC Count 5.20 4.40 - 5.90 10e6/uL    Hemoglobin 14.8 13.3 - 17.7 g/dL    Hematocrit 42.4 40.0 - 53.0 %    MCV 82 78 - 100 fL    MCH 28.5 26.5 - 33.0 pg    MCHC 34.9 31.5 - 36.5 g/dL    RDW 11.9 10.0 - 15.0 %    Platelet Count 573 (H) 150 - 450 10e3/uL    % Neutrophils 82 %    % Lymphocytes 12 %    % Monocytes 4 %    % Eosinophils 0 %    % Basophils 1 %    % Immature Granulocytes 1 %    NRBCs per 100 WBC 0 <1 /100    Absolute Neutrophils 5.9 1.6 - 8.3 10e3/uL    Absolute Lymphocytes 0.8 0.8 - 5.3 10e3/uL    Absolute Monocytes 0.3 0.0 - 1.3 10e3/uL    Absolute Eosinophils 0.0 0.0 - 0.7 10e3/uL    Absolute Basophils 0.1 0.0 - 0.2 10e3/uL    Absolute Immature Granulocytes 0.1 <=0.4 10e3/uL    Absolute NRBCs 0.0 10e3/uL       Medications   lactated ringers BOLUS 1,000 mL (0 mLs Intravenous Stopped 5/12/23 1543)     Followed by   lactated ringers BOLUS 1,000 mL (0 mLs Intravenous Stopped 5/12/23 1543)     Followed by   lactated ringers infusion (has no administration in time range)   ondansetron (ZOFRAN) injection 4 mg (4 mg Intravenous $Given 5/12/23 1342)   cloNIDine (CATAPRES) tablet 0.2 mg (0.1 mg Oral $Given 5/12/23 1344)   LORazepam (ATIVAN) injection 1 mg (1 mg Intravenous $Given 5/12/23 5862)       Assessments & Plan (with Medical Decision Making)  33-year-old male with opiate dependence and withdrawal after fentanyl use over the last few months by report.  He is monitored in treated here for 8 hours in the emergency department with improvement and stability.  He was  excepted in an opiate detox facility in [place] called [facility] tonight.  Also is scheduled for rule 25 on Tuesday.  Can return anytime sooner if condition worsens or needs further help     I have reviewed the nursing notes.    I have reviewed the findings, diagnosis, plan and need for follow up with the patient.      Discharge Medication List as of 5/12/2023  8:34 PM          Final diagnoses:   Opioid dependence with withdrawal (H)       5/12/2023   [facility] EMERGENCY DEPT     Eric Granger MD  05/12/23 6365

## 2023-05-12 NOTE — ED TRIAGE NOTES
Pt c/o withdrawal, hoping to get to rehab/treatment.  Has been taking street fentanyl PO x 3 months.  Last ingestion 0700.  Typically take fentanyl Q6 hours daily.  Pale, shaking, diaphoretic. Denies other social drug use.      Triage Assessment     Row Name 05/12/23 1234       Triage Assessment (Adult)    Airway WDL WDL       Respiratory WDL    Respiratory WDL WDL       Skin Circulation/Temperature WDL    Skin Circulation/Temperature WDL X

## 2023-05-12 NOTE — TELEPHONE ENCOUNTER
Patient sent this FMLA form requesting for you to fill out, PCP is Dr. Fajardo. But patient has upcoming visit with you 6/23. Are you able to do this?

## 2023-05-13 NOTE — DISCHARGE INSTRUCTIONS
Go to the [facility] treatment facility in [place] as arranged tonight.  Follow-up on Tuesday with oral 25 as scheduled.

## 2023-05-22 ENCOUNTER — VIRTUAL VISIT (OUTPATIENT)
Dept: FAMILY MEDICINE | Facility: CLINIC | Age: 33
End: 2023-05-22
Payer: COMMERCIAL

## 2023-05-22 DIAGNOSIS — F11.23 OPIOID DEPENDENCE WITH WITHDRAWAL (H): Primary | ICD-10-CM

## 2023-05-22 PROCEDURE — 99214 OFFICE O/P EST MOD 30 MIN: CPT | Mod: GT | Performed by: FAMILY MEDICINE

## 2023-05-22 RX ORDER — CLONIDINE HYDROCHLORIDE 0.1 MG/1
0.1 TABLET ORAL 3 TIMES DAILY PRN
Qty: 45 TABLET | Refills: 0 | Status: SHIPPED | OUTPATIENT
Start: 2023-05-22

## 2023-05-22 NOTE — PROGRESS NOTES
Ulysses is a 33 year old who is being evaluated via a billable video visit.      How would you like to obtain your AVS? MyChart  If the video visit is dropped, the invitation should be resent by: Text to cell phone: [phone]  Will anyone else be joining your video visit? No          Assessment & Plan     ASSESSMENT/ORDERS:    ICD-10-CM    1. Opioid dependence with withdrawal (H)  F11.23 cloNIDine (CATAPRES) 0.1 MG tablet        PLAN:  1.  Refilled clonidine.  Will complete FMLA paper once I see it for dates as noted below.  Follow-up recommended after he completed 30 day treatment to see how he is doing.       32 minutes spent by me on the date of the encounter doing chart review, patient visit and documentation            Ulsyses Caballero MD  [facility]   Ulysses is a 33 year old, presenting for the following health issues:  FMLA         5/22/2023     1:12 PM   Additional Questions   Roomed by Constanza COHEN   Accompanied by self     Forms 5/22/2023   Any forms needing to be completed Yes     History of Present Illness       Reason for visit:  Kratom withdrawal    He eats 0-1 servings of fruits and vegetables daily.He consumes 3 sweetened beverage(s) daily.He exercises with enough effort to increase his heart rate 9 or less minutes per day.  He exercises with enough effort to increase his heart rate 3 or less days per week.   He is taking medications regularly.       Pt wants FMLA paperwork for leave of absence of work, Pt would need like it faxed back to him.      Follow-up from last virtual visit.  At last visit, was discussed that he needed a 2 week leave of absence.      He notes that he got back into opioids during his [facility]IA.      He has been dabbling of and on for past 3 months with opioids again.      Has been talking to work's HR about getting on FMLA so he can go to [facility] treatment program, 30 day treatment program in [place] and then in outpatient treatment  program.    Completed Rule 25 last week, waiting to get a bed for treatment program.      Last use was 11 days ago, the day before ER visit.    Patient's last day of work was 4/12/2023.  He wants to be back at work 7/5/2023      Review of Systems         Objective    Vitals - Patient Reported  Weight (Patient Reported): 79.4 kg (175 lb)  Pain Score: No Pain (0)        Physical Exam   GENERAL: Healthy, alert and no distress  EYES: Eyes grossly normal to inspection.  No discharge or erythema, or obvious scleral/conjunctival abnormalities.  RESP: No audible wheeze, cough, or visible cyanosis.  No visible retractions or increased work of breathing.    SKIN: Visible skin clear. No significant rash, abnormal pigmentation or lesions.  NEURO: Cranial nerves grossly intact.  Mentation and speech appropriate for age.  PSYCH: Mentation appears normal, affect normal/bright, judgement and insight intact, normal speech and appearance well-groomed.                Video-Visit Details    Type of service:  Video Visit   Start of visit:  2:03 PM  End of visit:  2:33 PM    Originating Location (pt. Location): Home    Distant Location (provider location):  On-site  Platform used for Video Visit: Eunice

## 2023-05-29 ENCOUNTER — MYC MEDICAL ADVICE (OUTPATIENT)
Dept: FAMILY MEDICINE | Facility: CLINIC | Age: 33
End: 2023-05-29
Payer: COMMERCIAL

## 2023-05-30 NOTE — TELEPHONE ENCOUNTER
Form printed from [employer] FAX - placed on PCP desk    Patient called, I was able to locate the form in [employer] Fax and print.    Patient needs filled out as soon as possible.  He is entering treatment.    Patient requests form be faxed to employer:    Attn: Cathy Hodge, Human Resources  Fax: [phone]    Original be placed at  for mother (Millie Andrea) to .    Scanned in chart as well.    Brittany Berrios XRO/

## 2023-06-04 ENCOUNTER — TELEPHONE (OUTPATIENT)
Dept: FAMILY MEDICINE | Facility: OTHER | Age: 33
End: 2023-06-04
Payer: COMMERCIAL

## 2023-06-04 DIAGNOSIS — F11.10 OPIOID USE DISORDER, MILD, ABUSE (H): Primary | ICD-10-CM

## 2023-06-05 NOTE — TELEPHONE ENCOUNTER
Please call patient.  He has appt to discuss Suboxone, I do not have any experience with this so it may be better to meet with Dr. Chu or Dr. Matthews to discuss as they are better equipped to answer questions regarding this.     Lupe Robertson PA-C

## 2023-06-05 NOTE — TELEPHONE ENCOUNTER
Called and spoke with patient and informed him provider [facility] does not prescribe Suboxone.   Discussed with patient there are providers that do prescribe this in either [place] or [place].   Patient reports he has no preference between the two locations as he is currently in treatment and appointment would need to be virtual.     Routing to providers to review chart and determine if appropriate to schedule.     Astrid RENEEN, RN  [facility]

## 2023-06-05 NOTE — TELEPHONE ENCOUNTER
I would be more than happy to have discussions of Suboxone with the patient but I do in person only appointment especially for initiation/induction.  Looks like he has not been on this before in the past?  It would be best for him to schedule appointment with me as soon as possible if he is interested though.  Do we know how long he is in treatment for?  If he is in treatment for some time still, it could be an idea to have an appointment as soon as he is no longer in treatment to initiate him on Suboxone.  Please let me know what he thinks.  If interested, feel free to schedule him at the earliest convenience for an in person visit with myself.      Thank you,    Cornel Matthews MD    [address] [address]   Ph: [phone]  Fax:[phone]

## 2023-06-05 NOTE — TELEPHONE ENCOUNTER
RN Triage    Patient Contact    Attempt # 1    Was call answered?  No.  Left message on voicemail with information to call me back.    MILADYS Rosales, RN, PHN  [place]  June 5, 2023

## 2023-06-06 ENCOUNTER — TELEPHONE (OUTPATIENT)
Dept: FAMILY MEDICINE | Facility: OTHER | Age: 33
End: 2023-06-06
Payer: COMMERCIAL

## 2023-06-06 NOTE — TELEPHONE ENCOUNTER
RN Triage    Patient Contact    Attempt # 2    Was call answered?  No.  Left message on voicemail with information to call me back.    1. I would be more than happy to have discussions of Suboxone with the patient but I do in person only appointment especially for initiation/induction.  Looks like he has not been on this before in the past?  It would be best for him to schedule appointment with me as soon as possible if he is interested though.  Do we know how long he is in treatment for?  If he is in treatment for some time still, it could be an idea to have an appointment as soon as he is no longer in treatment to initiate him on Suboxone.  Please let me know what he thinks.  If interested, feel free to schedule him at the earliest convenience for an in person visit with myself.      Thank you,    Cornel Matthews MD ([place])    2.  in [place] is also willing to see the patient.     Geraldine Renae, THELMAN, RN, PHN  [place] [facility]  June 6, 2023

## 2023-06-06 NOTE — TELEPHONE ENCOUNTER
Duplicate message. Review encounter on 06/04/23.   THELMA RosalesN, RN, PHN  [place] [facility]  June 6, 2023

## 2023-06-06 NOTE — TELEPHONE ENCOUNTER
Reason for Call:  Other returning call    Detailed comments: patient called and needs an appointment with either  or . at ER Clinic.    Patient needs appointment in 1 week for Saboxone.    Provider booked.    Please contact patient.  Thank you.    Phone Number Patient can be reached at: Cell number on file:    Telephone Information:   Mobile [phone]       Best Time: any    Can we leave a detailed message on this number? YES    Call taken on 6/6/2023 at 3:34 PM by Jenniffer Schwartz

## 2023-06-06 NOTE — TELEPHONE ENCOUNTER
Patient is currently in treatment and can only travel 30 miles from [place]. Therefore, needs to be in [place]. Patient only has 5 days left. Provider does not have any same day or approval needed slots.     , are you able to work this patient in?       Geraldine Renae, THELMAN, RN, PHN  [place] [facility]  June 6, 2023

## 2023-06-07 RX ORDER — BUPRENORPHINE HYDROCHLORIDE AND NALOXONE HYDROCHLORIDE DIHYDRATE 2; .5 MG/1; MG/1
2 TABLET SUBLINGUAL 2 TIMES DAILY
COMMUNITY
End: 2023-06-07

## 2023-06-07 NOTE — TELEPHONE ENCOUNTER
Left message for patient to return call to any triage nurse.  Please inform and gather information as requested below for Dr. Matthews.    Peter Whitt, THELMAN, RN, PHN  [facility] ~ Registered Nurse  Clinic Triage ~ [place] [facility]  June 7, 2023

## 2023-06-07 NOTE — TELEPHONE ENCOUNTER
Next Friday feel free to use a 30 min vasectomy slot for clinic appt. Once scheduled, I can send a bridge prescription.    What current dosage is he on? Which pharmacy should I send to? And how many days does he have of his current prescription?      Thank you,    Cornel Matthews MD    [address] [address]   Ph: [phone]  Fax:[phone]

## 2023-06-07 NOTE — TELEPHONE ENCOUNTER
Spoke with patient.  Currently he is on 2 mg tablets twice daily. (not film) received them from [facility]. Updated med list with suboxone.     Script is for Take 1-2 tablets BID if needed.  16 tablets left.  Taking 4 tablets daily. = 4 full days starting today. He feels that this is not quiet enough, however, understood that provider will not change script without being seen. He'll need Sunday through appointment day of Suboxone.    Scheduled for Friday June 16 at 1130am.    Will route pending rx.  Pending Prescriptions:                       Disp   Refills    buprenorphine-naloxone (SUBOXONE) 2-0.5 M*24 tab*0            Sig: Place 2 tablets under the tongue 2 times daily    Signed Prescriptions:                        Disp   Refills    buprenorphine-naloxone (SUBOXONE) 2-0.5 MG*                Sig: Place 2 tablets under the tongue 2 times daily  Authorizing Provider: [facility]  Ordering User: RAJIV MERINO, THELMAN, RN, PHN  [facility] ~ Registered Nurse  Clinic Triage ~ [place] [facility]  June 7, 2023

## 2023-06-08 RX ORDER — BUPRENORPHINE HYDROCHLORIDE AND NALOXONE HYDROCHLORIDE DIHYDRATE 2; .5 MG/1; MG/1
2 TABLET SUBLINGUAL 2 TIMES DAILY
Qty: 20 TABLET | Refills: 0 | Status: SHIPPED | OUTPATIENT
Start: 2023-06-11 | End: 2023-01-01

## 2023-06-08 NOTE — TELEPHONE ENCOUNTER
Per the PDMP he did get a refill on 6/5 to 7 days, did place a prescription for 5 days (Monday through Friday next week) until his appointment with me next Friday.      Thank you,    Cornel Matthews MD    [address] [address]   Ph: [phone]  Fax:[phone]

## 2023-06-08 NOTE — TELEPHONE ENCOUNTER
RN Triage    Patient Contact    Attempt # 1    Was call answered?  No.  Left message on voicemail with information to call me back.      Per the PDMP he did get a refill on 6/5 to 7 days, did place a prescription for 5 days (Monday through Friday next week) until his appointment with me next Friday.      Thank you,    Cornel Matthews MD    [address] [address]   Ph: [phone]  Fax:[phone]    MILADYS Rosales, RN, PHN  [place] [facility] [place]  June 8, 2023

## 2023-06-09 NOTE — TELEPHONE ENCOUNTER
Attempt #2    Left message on voicemail with information to call back triage nurse line.    Also left mychart message.    Kaycee العلي RN  [facility] ~ Registered Nurse  Clinic Triage ~ [place] [facility] [place]  June 9, 2023

## 2023-06-12 NOTE — TELEPHONE ENCOUNTER
Attempted to contact patient x3. No answer.   If patient calls back please review message below.    Per the PDMP he did get a refill on 6/5 to 7 days, did place a prescription for 5 days (Monday through Friday next week) until his appointment with me next Friday.      Thank you,    Cornel Matthews MD    [address] [address]   Ph: [phone]  Fax:[phone]      MILADYS Rosales, RN, PHN  [facility]  June 12, 2023

## 2023-06-16 NOTE — TELEPHONE ENCOUNTER
Patient had to reschedule his appointment today due to transportation issues.  Patient scheduled with providers next available and is asking if provider can work him in sooner than 8/4.  Patient is also asking if he could get a refill on his buprenorphine-naloxone to get to appointment date.     Patient prefers [facility] [id number] - [place], [place] - [address]  Pharmacy.

## 2023-08-23 NOTE — PROGRESS NOTES
Ulysses is a 33 year old who is being evaluated via a billable video visit.      How would you like to obtain your AVS? MyChart  If the video visit is dropped, the invitation should be resent by: Text to cell phone: [phone]  Will anyone else be joining your video visit? No          Assessment & Plan   Problem List Items Addressed This Visit    None  Visit Diagnoses       Acute bilateral low back pain with right-sided sciatica    -  Primary    Relevant Medications    methocarbamol (ROBAXIN) 500 MG tablet    naproxen (NAPROSYN) 500 MG tablet    predniSONE (DELTASONE) 20 MG tablet    Other Relevant Orders    Physical Therapy Referral           Prednisone 40 mg daily for 5 days  Robaxin 500 mg three times a day  as needed  Naproxen 500 mg twice a day as needed with food   Heating pad for muscle relaxation   Start PT    Follow up in person in 5-7 days if not better or worsening     15 minutes spent by me on the date of the encounter doing chart review, history and exam, documentation and further activities per the note           Danika Zacarias PA-C  [facility]   Ulysses is a 33 year old, presenting for the following health issues:  Back Pain        8/23/2023    10:32 AM   Additional Questions   Roomed by Pretty       History of Present Illness       Back Pain:  He presents for follow up of back pain. Patient's back pain is a new problem.    Original cause of back pain: lifting  First noticed back pain: in the last week  Patient feels back pain: constantlyLocation of back pain:  Right lower back and left lower back  Description of back pain: dull ache, sharp and shooting  Back pain spreads: right buttocks, left buttocks, right thigh and left thigh    Since patient first noticed back pain, pain is: unchanged  Does back pain interfere with his job:  Yes  On a scale of 1-10 (10 being the worst), patient describes pain as:  6  What makes back pain worse: bending and twisting    Acupuncture: not tried  Acetaminophen: helpful  Activity or exercise: not helpful  Chiropractor:  Not tried  Cold: not helpful  Heat: not helpful  Massage: not tried  Muscle relaxants: not tried  NSAIDS: helpful  Opioids: not tried  Physical Therapy: not tried  Rest: not helpful  Steroid Injection: not tried  Stretching: not tried  Surgery: not tried  TENS unit: helpful  Topical pain relievers: not tried  Other healthcare providers patient is seeing for back pain: None    He eats 2-3 servings of fruits and vegetables daily.He consumes 1 sweetened beverage(s) daily.He exercises with enough effort to increase his heart rate 30 to 60 minutes per day.  He exercises with enough effort to increase his heart rate 4 days per week.   He is taking medications regularly.       Back Pain     Duration: 10 days        Specific cause: lifting, turning/bending  Description:   Location of pain: low back bilateral, but worse on the right   Character of pain: sharp, burning, and cramping  Pain radiation:radiates into the right buttocks, radiates into the left buttocks, and and both tight  New numbness or weakness in legs, not attributed to pain:  no   Intensity: moderate  History:   Pain interferes with job: YES  History of back problems: recurrent self limited episodes of low back pain in the past  Any previous MRI or X-rays: None  Sees a specialist for back pain:  No  Therapies tried without relief: acetaminophen (Tylenol) and NSAIDs  Alleviating factors:   Improved by: NSAIDs and rest    Precipitating factors:  Worsened by: Lifting and Bending        Accompanying Signs & Symptoms:  Risk of Fracture:  None  Risk of Cauda Equina:  None  Risk of Infection:  None  Risk of Cancer:  None  Risk of Ankylosing Spondylitis:  Onset at age <35, male, AND morning back stiffness. no         Review of Systems   Musculoskeletal:  Positive for back pain.      Constitutional, HEENT, cardiovascular, pulmonary, gi and gu systems are negative, except  as otherwise noted.      Objective           Vitals:  No vitals were obtained today due to virtual visit.    Physical Exam   GENERAL: Healthy, alert and no distress  EYES: Eyes grossly normal to inspection.  No discharge or erythema, or obvious scleral/conjunctival abnormalities.  RESP: No audible wheeze, cough, or visible cyanosis.  No visible retractions or increased work of breathing.    SKIN: Visible skin clear. No significant rash, abnormal pigmentation or lesions.  NEURO: Cranial nerves grossly intact.  Mentation and speech appropriate for age.  PSYCH: Mentation appears normal, affect normal/bright, judgement and insight intact, normal speech and appearance well-groomed.                Video-Visit Details    Type of service:  Video Visit     Originating Location (pt. Location): Home    Distant Location (provider location):  Off-site  Platform used for Video Visit: Zairge

## 2024-01-01 ENCOUNTER — HOSPITAL ENCOUNTER (EMERGENCY)
Facility: CLINIC | Age: 34
Discharge: HOME | End: 2024-03-20
Attending: FAMILY MEDICINE | Admitting: FAMILY MEDICINE
Payer: COMMERCIAL

## 2024-01-01 ENCOUNTER — HOSPITAL ENCOUNTER (EMERGENCY)
Facility: CLINIC | Age: 34
Discharge: HOME | End: 2024-04-04
Attending: EMERGENCY MEDICINE | Admitting: EMERGENCY MEDICINE
Payer: COMMERCIAL

## 2024-01-01 ENCOUNTER — APPOINTMENT (OUTPATIENT)
Dept: CT IMAGING | Facility: CLINIC | Age: 34
End: 2024-01-01
Attending: EMERGENCY MEDICINE
Payer: COMMERCIAL

## 2024-01-01 ENCOUNTER — HOSPITAL ENCOUNTER (EMERGENCY)
Facility: CLINIC | Age: 34
Discharge: HOME | End: 2024-02-08
Attending: EMERGENCY MEDICINE | Admitting: EMERGENCY MEDICINE
Payer: COMMERCIAL

## 2024-01-01 ENCOUNTER — TELEPHONE (OUTPATIENT)
Dept: INTERNAL MEDICINE | Facility: CLINIC | Age: 34
End: 2024-01-01
Payer: COMMERCIAL

## 2024-01-01 VITALS
BODY MASS INDEX: 21.9 KG/M2 | SYSTOLIC BLOOD PRESSURE: 137 MMHG | DIASTOLIC BLOOD PRESSURE: 89 MMHG | HEART RATE: 86 BPM | OXYGEN SATURATION: 96 % | RESPIRATION RATE: 18 BRPM | TEMPERATURE: 98 F | WEIGHT: 166 LBS

## 2024-01-01 VITALS
DIASTOLIC BLOOD PRESSURE: 92 MMHG | RESPIRATION RATE: 13 BRPM | TEMPERATURE: 99 F | WEIGHT: 179 LBS | BODY MASS INDEX: 23.62 KG/M2 | OXYGEN SATURATION: 92 % | SYSTOLIC BLOOD PRESSURE: 134 MMHG | HEART RATE: 109 BPM

## 2024-01-01 VITALS
RESPIRATION RATE: 25 BRPM | OXYGEN SATURATION: 97 % | HEART RATE: 98 BPM | DIASTOLIC BLOOD PRESSURE: 65 MMHG | TEMPERATURE: 98.1 F | SYSTOLIC BLOOD PRESSURE: 102 MMHG | BODY MASS INDEX: 21.9 KG/M2 | WEIGHT: 166 LBS

## 2024-01-01 DIAGNOSIS — R20.2 PARESTHESIAS: ICD-10-CM

## 2024-01-01 DIAGNOSIS — M54.16 LUMBAR RADICULOPATHY: ICD-10-CM

## 2024-01-01 DIAGNOSIS — R45.851 SUICIDAL IDEATION: ICD-10-CM

## 2024-01-01 DIAGNOSIS — R35.0 URINARY FREQUENCY: ICD-10-CM

## 2024-01-01 DIAGNOSIS — Z59.00 HOMELESS: ICD-10-CM

## 2024-01-01 DIAGNOSIS — T50.902A INTENTIONAL DRUG OVERDOSE, INITIAL ENCOUNTER (H): ICD-10-CM

## 2024-01-01 LAB
ALBUMIN SERPL BCG-MCNC: 4.4 G/DL (ref 3.5–5.2)
ALBUMIN SERPL BCG-MCNC: 4.9 G/DL (ref 3.5–5.2)
ALBUMIN UR-MCNC: 30 MG/DL
ALP SERPL-CCNC: 68 U/L (ref 40–150)
ALP SERPL-CCNC: 73 U/L (ref 40–150)
ALT SERPL W P-5'-P-CCNC: 47 U/L (ref 0–70)
ALT SERPL W P-5'-P-CCNC: 9 U/L (ref 0–70)
AMPHETAMINES UR QL SCN: ABNORMAL
ANION GAP SERPL CALCULATED.3IONS-SCNC: 12 MMOL/L (ref 7–15)
ANION GAP SERPL CALCULATED.3IONS-SCNC: 20 MMOL/L (ref 7–15)
ANION GAP SERPL CALCULATED.3IONS-SCNC: 9 MMOL/L (ref 7–15)
APAP SERPL-MCNC: <5 UG/ML (ref 10–30)
APAP SERPL-MCNC: <5 UG/ML (ref 10–30)
APPEARANCE UR: ABNORMAL
AST SERPL W P-5'-P-CCNC: 13 U/L (ref 0–45)
AST SERPL W P-5'-P-CCNC: 34 U/L (ref 0–45)
BACTERIA #/AREA URNS HPF: ABNORMAL /HPF
BARBITURATES UR QL SCN: ABNORMAL
BASE EXCESS BLDV CALC-SCNC: -4.7 MMOL/L (ref -3–3)
BASOPHILS # BLD AUTO: 0 10E3/UL (ref 0–0.2)
BASOPHILS NFR BLD AUTO: 0 %
BASOPHILS NFR BLD AUTO: 0 %
BASOPHILS NFR BLD AUTO: 1 %
BENZODIAZ UR QL SCN: ABNORMAL
BILIRUB SERPL-MCNC: 0.3 MG/DL
BILIRUB SERPL-MCNC: 0.4 MG/DL
BILIRUB UR QL STRIP: NEGATIVE
BUN SERPL-MCNC: 11.4 MG/DL (ref 6–20)
BUN SERPL-MCNC: 15.7 MG/DL (ref 6–20)
BUN SERPL-MCNC: 18.1 MG/DL (ref 6–20)
BZE UR QL SCN: ABNORMAL
CALCIUM SERPL-MCNC: 10.1 MG/DL (ref 8.6–10)
CALCIUM SERPL-MCNC: 9.4 MG/DL (ref 8.6–10)
CALCIUM SERPL-MCNC: 9.9 MG/DL (ref 8.6–10)
CANNABINOIDS UR QL SCN: ABNORMAL
CHLORIDE SERPL-SCNC: 100 MMOL/L (ref 98–107)
CHLORIDE SERPL-SCNC: 102 MMOL/L (ref 98–107)
CHLORIDE SERPL-SCNC: 99 MMOL/L (ref 98–107)
COHGB MFR BLD: 1.2 % (ref 0–2)
COLOR UR AUTO: YELLOW
CREAT SERPL-MCNC: 0.75 MG/DL (ref 0.67–1.17)
CREAT SERPL-MCNC: 0.86 MG/DL (ref 0.67–1.17)
CREAT SERPL-MCNC: 1 MG/DL (ref 0.67–1.17)
DEPRECATED HCO3 PLAS-SCNC: 18 MMOL/L (ref 22–29)
DEPRECATED HCO3 PLAS-SCNC: 26 MMOL/L (ref 22–29)
DEPRECATED HCO3 PLAS-SCNC: 30 MMOL/L (ref 22–29)
EGFRCR SERPLBLD CKD-EPI 2021: >90 ML/MIN/1.73M2
EOSINOPHIL # BLD AUTO: 0 10E3/UL (ref 0–0.7)
EOSINOPHIL # BLD AUTO: 0.1 10E3/UL (ref 0–0.7)
EOSINOPHIL # BLD AUTO: 0.1 10E3/UL (ref 0–0.7)
EOSINOPHIL NFR BLD AUTO: 0 %
EOSINOPHIL NFR BLD AUTO: 1 %
EOSINOPHIL NFR BLD AUTO: 2 %
ERYTHROCYTE [DISTWIDTH] IN BLOOD BY AUTOMATED COUNT: 12.2 % (ref 10–15)
ERYTHROCYTE [DISTWIDTH] IN BLOOD BY AUTOMATED COUNT: 12.9 % (ref 10–15)
ERYTHROCYTE [DISTWIDTH] IN BLOOD BY AUTOMATED COUNT: 13.2 % (ref 10–15)
ETHANOL SERPL-MCNC: <0.01 G/DL
ETHANOL SERPL-MCNC: <0.01 G/DL
FENTANYL UR QL: ABNORMAL
GLUCOSE SERPL-MCNC: 104 MG/DL (ref 70–99)
GLUCOSE SERPL-MCNC: 129 MG/DL (ref 70–99)
GLUCOSE SERPL-MCNC: 134 MG/DL (ref 70–99)
GLUCOSE UR STRIP-MCNC: NEGATIVE MG/DL
HCO3 BLDV-SCNC: 21 MMOL/L (ref 21–28)
HCT VFR BLD AUTO: 40.9 % (ref 40–53)
HCT VFR BLD AUTO: 41.6 % (ref 40–53)
HCT VFR BLD AUTO: 43 % (ref 40–53)
HGB BLD-MCNC: 14 G/DL (ref 13.3–17.7)
HGB BLD-MCNC: 14.4 G/DL (ref 13.3–17.7)
HGB BLD-MCNC: 14.7 G/DL (ref 13.3–17.7)
HGB UR QL STRIP: NEGATIVE
HOLD SPECIMEN: NORMAL
HYALINE CASTS: 8 /LPF
IMM GRANULOCYTES # BLD: 0 10E3/UL
IMM GRANULOCYTES NFR BLD: 0 %
KETONES UR STRIP-MCNC: 5 MG/DL
LACTATE SERPL-SCNC: 1.9 MMOL/L (ref 0.7–2)
LACTATE SERPL-SCNC: 8.5 MMOL/L (ref 0.7–2)
LEUKOCYTE ESTERASE UR QL STRIP: NEGATIVE
LYMPHOCYTES # BLD AUTO: 0.9 10E3/UL (ref 0.8–5.3)
LYMPHOCYTES # BLD AUTO: 1.4 10E3/UL (ref 0.8–5.3)
LYMPHOCYTES # BLD AUTO: 2.1 10E3/UL (ref 0.8–5.3)
LYMPHOCYTES NFR BLD AUTO: 10 %
LYMPHOCYTES NFR BLD AUTO: 21 %
LYMPHOCYTES NFR BLD AUTO: 35 %
MCH RBC QN AUTO: 28.8 PG (ref 26.5–33)
MCH RBC QN AUTO: 29 PG (ref 26.5–33)
MCH RBC QN AUTO: 29.1 PG (ref 26.5–33)
MCHC RBC AUTO-ENTMCNC: 34.2 G/DL (ref 31.5–36.5)
MCHC RBC AUTO-ENTMCNC: 34.2 G/DL (ref 31.5–36.5)
MCHC RBC AUTO-ENTMCNC: 34.6 G/DL (ref 31.5–36.5)
MCV RBC AUTO: 84 FL (ref 78–100)
MCV RBC AUTO: 84 FL (ref 78–100)
MCV RBC AUTO: 85 FL (ref 78–100)
MONOCYTES # BLD AUTO: 0.3 10E3/UL (ref 0–1.3)
MONOCYTES # BLD AUTO: 0.5 10E3/UL (ref 0–1.3)
MONOCYTES # BLD AUTO: 0.7 10E3/UL (ref 0–1.3)
MONOCYTES NFR BLD AUTO: 10 %
MONOCYTES NFR BLD AUTO: 4 %
MONOCYTES NFR BLD AUTO: 9 %
MUCOUS THREADS #/AREA URNS LPF: PRESENT /LPF
NEUTROPHILS # BLD AUTO: 3.4 10E3/UL (ref 1.6–8.3)
NEUTROPHILS # BLD AUTO: 4.8 10E3/UL (ref 1.6–8.3)
NEUTROPHILS # BLD AUTO: 7.8 10E3/UL (ref 1.6–8.3)
NEUTROPHILS NFR BLD AUTO: 54 %
NEUTROPHILS NFR BLD AUTO: 68 %
NEUTROPHILS NFR BLD AUTO: 85 %
NITRATE UR QL: NEGATIVE
NRBC # BLD AUTO: 0 10E3/UL
NRBC BLD AUTO-RTO: 0 /100
O2/TOTAL GAS SETTING VFR VENT: 21 %
OPIATES UR QL SCN: ABNORMAL
OXYHGB MFR BLDV: 82 % (ref 70–75)
PCO2 BLDV: 41 MM HG (ref 40–50)
PCP QUAL URINE (ROCHE): ABNORMAL
PH BLDV: 7.32 [PH] (ref 7.32–7.43)
PH UR STRIP: 5 [PH] (ref 5–7)
PLATELET # BLD AUTO: 260 10E3/UL (ref 150–450)
PLATELET # BLD AUTO: 306 10E3/UL (ref 150–450)
PLATELET # BLD AUTO: 413 10E3/UL (ref 150–450)
PO2 BLDV: 48 MM HG (ref 25–47)
POTASSIUM SERPL-SCNC: 3.8 MMOL/L (ref 3.4–5.3)
POTASSIUM SERPL-SCNC: 3.9 MMOL/L (ref 3.4–5.3)
POTASSIUM SERPL-SCNC: 4 MMOL/L (ref 3.4–5.3)
PROT SERPL-MCNC: 7.3 G/DL (ref 6.4–8.3)
PROT SERPL-MCNC: 7.5 G/DL (ref 6.4–8.3)
RBC # BLD AUTO: 4.86 10E6/UL (ref 4.4–5.9)
RBC # BLD AUTO: 4.94 10E6/UL (ref 4.4–5.9)
RBC # BLD AUTO: 5.07 10E6/UL (ref 4.4–5.9)
RBC URINE: 3 /HPF
SALICYLATES SERPL-MCNC: <0.3 MG/DL
SALICYLATES SERPL-MCNC: <0.3 MG/DL
SAO2 % BLDV: 82.7 % (ref 70–75)
SODIUM SERPL-SCNC: 137 MMOL/L (ref 135–145)
SODIUM SERPL-SCNC: 139 MMOL/L (ref 135–145)
SODIUM SERPL-SCNC: 140 MMOL/L (ref 135–145)
SP GR UR STRIP: 1.03 (ref 1–1.03)
TROPONIN T SERPL HS-MCNC: <6 NG/L
TROPONIN T SERPL HS-MCNC: <6 NG/L
UROBILINOGEN UR STRIP-MCNC: NORMAL MG/DL
WBC # BLD AUTO: 6.2 10E3/UL (ref 4–11)
WBC # BLD AUTO: 7 10E3/UL (ref 4–11)
WBC # BLD AUTO: 9.1 10E3/UL (ref 4–11)
WBC URINE: 7 /HPF

## 2024-01-01 PROCEDURE — 99291 CRITICAL CARE FIRST HOUR: CPT | Mod: 25 | Performed by: FAMILY MEDICINE

## 2024-01-01 PROCEDURE — 99285 EMERGENCY DEPT VISIT HI MDM: CPT | Mod: 25 | Performed by: EMERGENCY MEDICINE

## 2024-01-01 PROCEDURE — 82077 ASSAY SPEC XCP UR&BREATH IA: CPT | Performed by: FAMILY MEDICINE

## 2024-01-01 PROCEDURE — 250N000011 HC RX IP 250 OP 636: Performed by: FAMILY MEDICINE

## 2024-01-01 PROCEDURE — 96361 HYDRATE IV INFUSION ADD-ON: CPT | Performed by: FAMILY MEDICINE

## 2024-01-01 PROCEDURE — 85041 AUTOMATED RBC COUNT: CPT | Performed by: EMERGENCY MEDICINE

## 2024-01-01 PROCEDURE — 84484 ASSAY OF TROPONIN QUANT: CPT | Performed by: FAMILY MEDICINE

## 2024-01-01 PROCEDURE — 36415 COLL VENOUS BLD VENIPUNCTURE: CPT | Performed by: EMERGENCY MEDICINE

## 2024-01-01 PROCEDURE — 96360 HYDRATION IV INFUSION INIT: CPT | Performed by: EMERGENCY MEDICINE

## 2024-01-01 PROCEDURE — 93005 ELECTROCARDIOGRAM TRACING: CPT | Performed by: EMERGENCY MEDICINE

## 2024-01-01 PROCEDURE — 80307 DRUG TEST PRSMV CHEM ANLYZR: CPT | Performed by: FAMILY MEDICINE

## 2024-01-01 PROCEDURE — 80143 DRUG ASSAY ACETAMINOPHEN: CPT | Performed by: FAMILY MEDICINE

## 2024-01-01 PROCEDURE — 99284 EMERGENCY DEPT VISIT MOD MDM: CPT | Performed by: EMERGENCY MEDICINE

## 2024-01-01 PROCEDURE — 70450 CT HEAD/BRAIN W/O DYE: CPT

## 2024-01-01 PROCEDURE — 80053 COMPREHEN METABOLIC PANEL: CPT | Performed by: EMERGENCY MEDICINE

## 2024-01-01 PROCEDURE — 99283 EMERGENCY DEPT VISIT LOW MDM: CPT | Mod: 25 | Performed by: EMERGENCY MEDICINE

## 2024-01-01 PROCEDURE — 250N000009 HC RX 250: Performed by: EMERGENCY MEDICINE

## 2024-01-01 PROCEDURE — 93010 ELECTROCARDIOGRAM REPORT: CPT | Performed by: EMERGENCY MEDICINE

## 2024-01-01 PROCEDURE — 83605 ASSAY OF LACTIC ACID: CPT | Performed by: FAMILY MEDICINE

## 2024-01-01 PROCEDURE — 80143 DRUG ASSAY ACETAMINOPHEN: CPT | Performed by: EMERGENCY MEDICINE

## 2024-01-01 PROCEDURE — 93005 ELECTROCARDIOGRAM TRACING: CPT | Performed by: FAMILY MEDICINE

## 2024-01-01 PROCEDURE — 84484 ASSAY OF TROPONIN QUANT: CPT | Performed by: EMERGENCY MEDICINE

## 2024-01-01 PROCEDURE — 85025 COMPLETE CBC W/AUTO DIFF WBC: CPT | Performed by: FAMILY MEDICINE

## 2024-01-01 PROCEDURE — 82805 BLOOD GASES W/O2 SATURATION: CPT | Performed by: FAMILY MEDICINE

## 2024-01-01 PROCEDURE — 250N000009 HC RX 250: Performed by: FAMILY MEDICINE

## 2024-01-01 PROCEDURE — 85025 COMPLETE CBC W/AUTO DIFF WBC: CPT | Performed by: EMERGENCY MEDICINE

## 2024-01-01 PROCEDURE — 96374 THER/PROPH/DIAG INJ IV PUSH: CPT | Mod: 59 | Performed by: FAMILY MEDICINE

## 2024-01-01 PROCEDURE — 250N000011 HC RX IP 250 OP 636: Performed by: EMERGENCY MEDICINE

## 2024-01-01 PROCEDURE — 36415 COLL VENOUS BLD VENIPUNCTURE: CPT | Performed by: FAMILY MEDICINE

## 2024-01-01 PROCEDURE — 258N000003 HC RX IP 258 OP 636: Performed by: EMERGENCY MEDICINE

## 2024-01-01 PROCEDURE — 258N000003 HC RX IP 258 OP 636: Performed by: FAMILY MEDICINE

## 2024-01-01 PROCEDURE — 250N000013 HC RX MED GY IP 250 OP 250 PS 637: Performed by: FAMILY MEDICINE

## 2024-01-01 PROCEDURE — 82077 ASSAY SPEC XCP UR&BREATH IA: CPT | Performed by: EMERGENCY MEDICINE

## 2024-01-01 PROCEDURE — 80179 DRUG ASSAY SALICYLATE: CPT | Performed by: FAMILY MEDICINE

## 2024-01-01 PROCEDURE — 81001 URINALYSIS AUTO W/SCOPE: CPT | Performed by: FAMILY MEDICINE

## 2024-01-01 PROCEDURE — 82375 ASSAY CARBOXYHB QUANT: CPT | Performed by: FAMILY MEDICINE

## 2024-01-01 PROCEDURE — 80048 BASIC METABOLIC PNL TOTAL CA: CPT | Performed by: EMERGENCY MEDICINE

## 2024-01-01 PROCEDURE — 70496 CT ANGIOGRAPHY HEAD: CPT

## 2024-01-01 PROCEDURE — 93010 ELECTROCARDIOGRAM REPORT: CPT | Performed by: FAMILY MEDICINE

## 2024-01-01 PROCEDURE — 80179 DRUG ASSAY SALICYLATE: CPT | Performed by: EMERGENCY MEDICINE

## 2024-01-01 PROCEDURE — 80053 COMPREHEN METABOLIC PANEL: CPT | Performed by: FAMILY MEDICINE

## 2024-01-01 RX ORDER — LORAZEPAM 2 MG/ML
0.5 INJECTION INTRAMUSCULAR
Status: DISCONTINUED | OUTPATIENT
Start: 2024-01-01 | End: 2024-01-01 | Stop reason: HOSPADM

## 2024-01-01 RX ORDER — BUPRENORPHINE AND NALOXONE 8; 2 MG/1; MG/1
1 FILM, SOLUBLE BUCCAL; SUBLINGUAL DAILY
Status: DISCONTINUED | OUTPATIENT
Start: 2024-01-01 | End: 2024-01-01

## 2024-01-01 RX ORDER — IOPAMIDOL 755 MG/ML
500 INJECTION, SOLUTION INTRAVASCULAR ONCE
Status: COMPLETED | OUTPATIENT
Start: 2024-01-01 | End: 2024-01-01

## 2024-01-01 RX ORDER — BUPRENORPHINE AND NALOXONE 4; 1 MG/1; MG/1
2 FILM, SOLUBLE BUCCAL; SUBLINGUAL DAILY
Status: DISCONTINUED | OUTPATIENT
Start: 2024-01-01 | End: 2024-01-01 | Stop reason: HOSPADM

## 2024-01-01 RX ORDER — LIDOCAINE HYDROCHLORIDE 20 MG/ML
JELLY TOPICAL ONCE
Status: COMPLETED | OUTPATIENT
Start: 2024-01-01 | End: 2024-01-01

## 2024-01-01 RX ORDER — METHYLPREDNISOLONE 4 MG
TABLET, DOSE PACK ORAL
Qty: 21 TABLET | Refills: 0 | Status: SHIPPED | OUTPATIENT
Start: 2024-01-01

## 2024-01-01 RX ADMIN — SODIUM CHLORIDE 1000 ML: 9 INJECTION, SOLUTION INTRAVENOUS at 11:29

## 2024-01-01 RX ADMIN — BUPRENORPHINE HYDROCHLORIDE, NALOXONE HYDROCHLORIDE 2 FILM: 4; 1 FILM, SOLUBLE BUCCAL; SUBLINGUAL at 13:37

## 2024-01-01 RX ADMIN — IOPAMIDOL 67 ML: 755 INJECTION, SOLUTION INTRAVENOUS at 11:42

## 2024-01-01 RX ADMIN — SODIUM CHLORIDE, POTASSIUM CHLORIDE, SODIUM LACTATE AND CALCIUM CHLORIDE 1000 ML: 600; 310; 30; 20 INJECTION, SOLUTION INTRAVENOUS at 12:29

## 2024-01-01 RX ADMIN — LORAZEPAM 0.5 MG: 2 INJECTION INTRAMUSCULAR; INTRAVENOUS at 13:50

## 2024-01-01 RX ADMIN — SODIUM CHLORIDE 1000 ML: 9 INJECTION, SOLUTION INTRAVENOUS at 20:32

## 2024-01-01 RX ADMIN — SODIUM CHLORIDE 100 ML: 9 INJECTION, SOLUTION INTRAVENOUS at 11:41

## 2024-01-01 RX ADMIN — LIDOCAINE HYDROCHLORIDE: 20 JELLY TOPICAL at 12:28

## 2024-01-01 RX ADMIN — SODIUM CHLORIDE 1000 ML: 9 INJECTION, SOLUTION INTRAVENOUS at 11:00

## 2024-01-01 SDOH — ECONOMIC STABILITY - HOUSING INSECURITY: HOMELESSNESS UNSPECIFIED: Z59.00

## 2024-01-01 ASSESSMENT — ACTIVITIES OF DAILY LIVING (ADL)
ADLS_ACUITY_SCORE: 33
ADLS_ACUITY_SCORE: 35
ADLS_ACUITY_SCORE: 33
ADLS_ACUITY_SCORE: 35
ADLS_ACUITY_SCORE: 35

## 2024-01-01 ASSESSMENT — COLUMBIA-SUICIDE SEVERITY RATING SCALE - C-SSRS
2. HAVE YOU ACTUALLY HAD ANY THOUGHTS OF KILLING YOURSELF IN THE PAST MONTH?: NO
1. IN THE PAST MONTH, HAVE YOU WISHED YOU WERE DEAD OR WISHED YOU COULD GO TO SLEEP AND NOT WAKE UP?: NO
IS THE PATIENT NOT ABLE TO COMPLETE C-SSRS: UNRESPONSIVE
6. HAVE YOU EVER DONE ANYTHING, STARTED TO DO ANYTHING, OR PREPARED TO DO ANYTHING TO END YOUR LIFE?: NO

## 2024-02-08 NOTE — LETTER
February 8, 2024      To Whom It May Concern:      Ulysses Fajardo was seen in our Emergency Department today, 02/08/24. He has missed work this week due to a medical illness.  I expect his condition to improve over the next 7 days.  He may return to work when improved.    Sincerely,        Josesito Galvin MD

## 2024-02-08 NOTE — ED TRIAGE NOTES
Patient presents with concern for frequency of urination. Patient states that he feels like he has to go and can go a little bit but does not get relief. Patient also states that he has pain radiating from L groin to L flank. Present for a few weeks now.

## 2024-02-09 NOTE — DISCHARGE INSTRUCTIONS
As discussed I am not sure the cause of your difficulty with urination.  We do need to get a urine specimen from you to find out if you have an infection.  Unfortunately we were unable to do that today because of time constraints and new needing to leave.  I would recommend you do that as a follow-up in the clinic.  As far as your back is concerned I think most likely you have a pinched nerve in your back.  You probably should get an MRI and go from there.  Again please follow-up with primary care where they can order the MRI and help facilitate follow-up.  Try the Medrol Dosepak for the back pain.  I hope you get better quickly.

## 2024-02-09 NOTE — ED PROVIDER NOTES
History     Chief Complaint   Patient presents with    Urinary Frequency     HPI  Ulysses Fajardo is a 33 year old male who with a history of opioid use disorder, IV drug use, hepatitis C who presents to the emergency department secondary to specialist subacute information provided urinary symptoms and subacute lumbar radiculopathy.  He has had 4 to 5 months of each of these problems.  When specifically asked what made him come in today he said that he had to miss work for the last week due to these symptoms and thus I believe he is wanting a work note.  He has had difficulty emptying his bladder off and on.  He said lumbar back pain with radiculopathy down the lateral aspect of his left thigh all the way down to his foot that is worse with twisting turning and bending at the waist and straight leg raise.  He does not have any numbness.  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  He had a virtual visit and was given steroids and Naprosyn.  He did not like the Naprosyn but the steroids seem to help little bit.  This was in approximately October.  He has not been seen for his urinary symptoms.  No fever, urinary incontinence, cough, congestion, runny nose, sore throat.  No abdominal pain or vomiting.  He has been hiccuping off-and-on for the last week.  He admits to not being very good at drinking much water.    Allergies:  No Known Allergies    Problem List:    Patient Active Problem List    Diagnosis Date Noted    Opioid use disorder, mild, in early remission (H) 04/20/2023     Priority: Medium    Attention deficit hyperactivity disorder (ADHD), unspecified ADHD type 04/20/2023     Priority: Medium    Hx of hepatitis C 04/19/2023     Priority: Medium     2/2 IVDU.   RNA quant negative.      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    Family History:    No family history on file.    Social History:  Marital Status:  Single [1]  Social History     Tobacco Use    Smoking status: Former     Packs/day: 1     Types: Cigarettes     Quit date: 10/23/2021     Years since quittin.2    Smokeless tobacco: Never   Vaping Use    Vaping Use: Every day   Substance Use Topics    Alcohol use: Not Currently    Drug use: Yes     Types: Fentanyl        Medications:    methylPREDNISolone (MEDROL DOSEPAK) 4 MG tablet therapy pack  atomoxetine (STRATTERA) 40 MG capsule  Buprenorphine HCl-Naloxone HCl (SUBOXONE) 12-3 MG FILM per film  buprenorphine HCl-naloxone HCl (SUBOXONE) 8-2 MG per film  cloNIDine (CATAPRES) 0.1 MG tablet  FLUoxetine (PROZAC) 20 MG capsule  GLYCERIN, ADULT, 2 g suppository  hydrOXYzine (ATARAX) 50 MG tablet  methocarbamol (ROBAXIN) 500 MG tablet  naloxone (NARCAN) 4 MG/0.1ML nasal spray  naproxen (NAPROSYN) 500 MG tablet  nicotine (NICODERM CQ) 21 MG/24HR 24 hr patch  nicotine polacrilex (NICORETTE) 4 MG gum  polyethylene glycol (MIRALAX) 17 GM/Dose powder  propranolol (INDERAL) 10 MG tablet  QUEtiapine (SEROQUEL) 50 MG tablet  STOOL SOFTENER/LAXATIVE 50-8.6 MG tablet  traZODone (DESYREL) 50 MG tablet          Review of Systems   All other systems reviewed and are negative.      Physical Exam   BP: (!) 140/91  Pulse: 103  Temp: 99  F (37.2  C)  Resp: 18  Weight: 81.2 kg (179 lb)  SpO2: 97 %      Physical Exam  Vitals and nursing note reviewed.   Constitutional:       General: He is not in acute distress.     Appearance: He is well-developed. He is not diaphoretic.   HENT:      Head: Normocephalic and atraumatic.      Nose: No congestion.      Mouth/Throat:      Mouth: Mucous membranes are moist.   Eyes:      General: No scleral icterus.     Extraocular Movements: Extraocular movements intact.      Conjunctiva/sclera: Conjunctivae  normal.      Pupils: Pupils are equal, round, and reactive to light.   Cardiovascular:      Rate and Rhythm: Normal rate.   Pulmonary:      Effort: Pulmonary effort is normal.   Abdominal:      General: Abdomen is flat.      Tenderness: There is no abdominal tenderness. There is no guarding or rebound.   Musculoskeletal:         General: Normal range of motion.      Cervical back: Normal range of motion and neck supple.      Right lower leg: No edema.      Left lower leg: No edema.      Comments: Increased pain down the left leg with straight leg raise.   Skin:     General: Skin is warm and dry.      Capillary Refill: Capillary refill takes less than 2 seconds.      Findings: No rash.   Neurological:      Mental Status: He is alert and oriented to person, place, and time.      Sensory: No sensory deficit.      Comments: Patient's eyelids are have open and he feels to open them up all the way during the entirety of our conversation.  He is coherent and speaks in full sentences that are not difficult to understand and do make sense.   Psychiatric:         Mood and Affect: Mood normal.         ED Course                 Procedures             Results for orders placed or performed during the hospital encounter of 02/08/24 (from the past 24 hour(s))   CBC with Platelets & Differential    Narrative    The following orders were created for panel order CBC with Platelets & Differential.  Procedure                               Abnormality         Status                     ---------                               -----------         ------                     CBC with platelets and d...[[id number]]                      Final result                 Please view results for these tests on the individual orders.   Basic metabolic panel   Result Value Ref Range    Sodium 139 135 - 145 mmol/L    Potassium 4.0 3.4 - 5.3 mmol/L    Chloride 100 98 - 107 mmol/L    Carbon Dioxide (CO2) 30 (H) 22 - 29 mmol/L    Anion Gap 9 7 - 15 mmol/L     Urea Nitrogen 15.7 6.0 - 20.0 mg/dL    Creatinine 0.86 0.67 - 1.17 mg/dL    GFR Estimate >90 >60 mL/min/1.73m2    Calcium 9.4 8.6 - 10.0 mg/dL    Glucose 104 (H) 70 - 99 mg/dL   CBC with platelets and differential   Result Value Ref Range    WBC Count 7.0 4.0 - 11.0 10e3/uL    RBC Count 4.86 4.40 - 5.90 10e6/uL    Hemoglobin 14.0 13.3 - 17.7 g/dL    Hematocrit 40.9 40.0 - 53.0 %    MCV 84 78 - 100 fL    MCH 28.8 26.5 - 33.0 pg    MCHC 34.2 31.5 - 36.5 g/dL    RDW 12.2 10.0 - 15.0 %    Platelet Count 260 150 - 450 10e3/uL    % Neutrophils 68 %    % Lymphocytes 21 %    % Monocytes 10 %    % Eosinophils 1 %    % Basophils 0 %    % Immature Granulocytes 0 %    NRBCs per 100 WBC 0 <1 /100    Absolute Neutrophils 4.8 1.6 - 8.3 10e3/uL    Absolute Lymphocytes 1.4 0.8 - 5.3 10e3/uL    Absolute Monocytes 0.7 0.0 - 1.3 10e3/uL    Absolute Eosinophils 0.1 0.0 - 0.7 10e3/uL    Absolute Basophils 0.0 0.0 - 0.2 10e3/uL    Absolute Immature Granulocytes 0.0 <=0.4 10e3/uL    Absolute NRBCs 0.0 10e3/uL       Medications   sodium chloride 0.9% BOLUS 1,000 mL (0 mLs Intravenous Stopped 2/8/24 2108)       Assessments & Plan (with Medical Decision Making)  33-year-old former IV drug user here with subacute problems including urinary symptoms and lumbar radiculopathy.  I ordered an IV, IV fluids, labs, urinalysis.  Labs are reassuring.  Patient was unable to urinate.  IV fluids were started.  Bladder scan was performed and there was less than 300 mL of urine in his bladder.  I advised patient to wait and finish the fluids and get a urinalysis but he had to leave to go  his elderly mother.  The symptoms are subacute and there is no signs of renal failure, pyelonephritis such as fever or leukocytosis.  I think he is safe to follow-up in the clinic since his symptoms go on back good for 5 months.  In regards to his lumbar radiculopathy those symptoms are also subacute and have been there for for 5 months.  He can follow-up with  the clinic for that as well.  He was offered a Medrol Dosepak and he wanted to try that.  I advised him that most likely he will need an MRI to see if he has a pinched nerve or slipped disc in his lumbar spine causing radicular symptoms.  He understands and agrees.  He was discharged home in stable condition.  Return to ER precautions and fall precautions discussed.  All questions answered prior to discharge.     I have reviewed the nursing notes.    I have reviewed the findings, diagnosis, plan and need for follow up with the patient.          New Prescriptions    METHYLPREDNISOLONE (MEDROL DOSEPAK) 4 MG TABLET THERAPY PACK    Follow Package Directions       Final diagnoses:   Urinary frequency   Lumbar radiculopathy       2/8/2024   [facility] EMERGENCY DEPT       Josesito Galvin MD  02/08/24 [id number]

## 2024-02-23 NOTE — TELEPHONE ENCOUNTER
Please confirm with caller that I was not in the office seeing any patients on 2/14/2024 and that this is a forged signature on the letter I reviewed.    Ulysses Caballero MD

## 2024-02-23 NOTE — TELEPHONE ENCOUNTER
FYI - Status Update    Who is Calling: JAYLEN Morgan from [facility]    Update: Cathy calling to confirm that patient had been seen in clinic by Dr. Caballero on 2/14 and was given a note excusing him from work.  Informed that we have no noted appointment with Ulysses Caballero on 2/14/24.    Last noted was that he was seen in the ED on 2/8/24 and did receive a note from the emergency room provider.  Cathy confirmed that she had that note from the ED.    Cathy is faxing the letter over to the clinic ([phone]) today before she leaves.  Letter received and placed on Dr. Caballero's desk.    Does caller want a call/response back: No, if any questions Cathy said you can reach her at [phone]

## 2024-03-20 PROBLEM — F33.2 SEVERE RECURRENT MAJOR DEPRESSION WITHOUT PSYCHOTIC FEATURES (H): Status: ACTIVE | Noted: 2024-01-01

## 2024-03-20 PROBLEM — F11.20 OPIOID TYPE DEPENDENCE, CONTINUOUS (H): Status: ACTIVE | Noted: 2024-01-01

## 2024-03-20 PROBLEM — F41.1 GAD (GENERALIZED ANXIETY DISORDER): Status: ACTIVE | Noted: 2024-01-01

## 2024-03-20 NOTE — ED NOTES
Patient reported to have taken an unknown amount of soma last night. Per Dr. Strong, he kept taking multiple until he passed out. Poison control contacted. Peak time is 2 hours. Symptoms include lethargy, dizziness and respiratory depression.

## 2024-03-20 NOTE — ED TRIAGE NOTES
Patient brought to ED after a bystander called 911 after he was found shaking in his truck. Per family patient left [place] yesterday and has been in his truck since, over night. Raina Koenig RN

## 2024-03-20 NOTE — CONSULTS
Diagnostic Evaluation Consultation  Crisis Assessment    Patient Name: Ulysses Fajardo  Age:  34 year old  Legal Sex: male  Gender Identity: male  Pronouns:   Race: White  Ethnicity: Not  or   Language: English      Patient was assessed: Virtual: [employer] Crisis Assessment Start Time: 1523 Crisis Assessment Stop Time: 1616  Patient location: [facility] EMERGENCY DEPT                             ED03    Referral Data and Chief Complaint  Ulysses Fajardo presents to the ED via EMS, via police. Patient is presenting to the ED for the following concerns: Significant behavioral change, Anxiety, Depression, Suicidal ideation, Suicide attempt.   Factors that make the mental health crisis life threatening or complex are:  Pt was discharged from CD treatment at [facility] yesterday.  Pt being homeless and was in transition to another sober housing program.  Pt stayed in his truck last night, became suicidal as he took unknown amount of Soma..      Informed Consent and Assessment Methods  Explained the crisis assessment process, including applicable information disclosures and limits to confidentiality, assessed understanding of the process, and obtained consent to proceed with the assessment.  Assessment methods included conducting a formal interview with patient, review of medical records, collaboration with medical staff, and obtaining relevant collateral information from family and community providers when available.  : done     Patient response to interventions: eager to participate, acceptance expressed, verbalizes understanding  Coping skills were attempted to reduce the crisis:  skateboarding, playing video games, R/C cars, watching TV, listening to music     History of the Crisis   Pt is a 34 year old White male with history of depression, anxiety, ADHD and Opioid abuse.  Pt was brought to the ER today by EMS due to a bystander finding him in his truck as he was shaking with altered  "mental health.  Pt was just discharged from CD treatment at [facility] yesterday. Pt being homeless and was in transition to another sober housing program. Pt stayed in his truck last night, became suicidal as he took unknown amount of Soma.  Pt remarked, \"I'm homeless, I was basically sleeping in my truck and had tough times, I have drug problems, in and out of treatments.\" as his reason for visiting the ER today.  Pt shared as he was staying in his truck last night, he started to ruminate about his future, being homeless and worried about his next sober housing might fall through as trigger to his current mental health crisis.  Pt endorsed increased depression, cry, worry, racing thoughts and anxiety.  Pt reported having poor sleep and loss of appetite.  Pt denied having acute psychosis and tyler.  Pt currently denied having suicidal ideations, but reported having thoughts of suicide last night as he decided to take Soma, muscle relaxant to end his life.  Pt reported he initially took 3 to 4 pills of Soma, then took 15 to 20 pills more.  Pt denied having homicidal ideations, access to firearms, and history of SIB.  Pt denied previous suicide attempt as last night was his first suicide attempt.    Brief Psychosocial History  Family:  Single, Children no  Support System:  Parent(s)  Employment Status:  unemployed, disabled  Source of Income:  none, disability, unable to assess  Financial Environmental Concerns:  none, unemployed  Current Hobbies:  outdoor activities, exercise/fitness, games, music, social media/computer activities, television/movies/videos  Barriers in Personal Life:  emotional concerns, behavioral concerns, mental health concerns, financial concerns    Significant Clinical History  Current Anxiety Symptoms:  excessive worry, racing thoughts, anxious, panic attack, shortness of breath or racing heart  Current Depression/Trauma:  apathy, crying or feels like crying, helplessness, hopelessness, " sadness, thoughts of death/suicide, impaired decision making, withdrawl/isolation  Current Somatic Symptoms:  excessive worry, sweating, flushing, shaking, shortness of breath or racing heart, anxious, racing thoughts  Current Psychosis/Thought Disturbance:  impulsive, high risk behavior  Current Eating Symptoms:  loss of appetite  Chemical Use History:  Alcohol: None  Benzodiazepines: None  Opiates: Heroin other (Pt reported using heroin daily and his last use was about a month ago.)  Last Use:: 02/20/24  Cocaine: None  Marijuana: None  Other Use: None   Past diagnosis:  Anxiety Disorder, Depression, Substance Use Disorder  Family history:  Substance Use Disorder  Past treatment:  Individual therapy, Psychiatric Medication Management, Supportive Living Environment (group home, long-term house, etc), Inpatient Hospitalization  Details of most recent treatment:  Pt reported history of psychiatric hospitalization in [place] more than 15 years ago.  Pt recently discharged from CD treatment at [facility] yesterday.  Pt reported current outpatient psychiatry for medication management but has no outpatient therapy service.  Other relevant history:  Pt shared his parents were  and his dad passed away.  Pt reported having 2 sisters.  Pt reported he and his mom just recently moved to [place] from [place] for their CD treatments.  Pt shared he was single and has no children.  Pt reported being homeless and unemployed.  Pt denied having medical conditions and history of legal issues.  Pt denied history of being abused.       Collateral Information  Is there collateral information: Yes     Collateral information name, relationship, phone number:  Millie Quintero [phone]    What happened today: Millie reported Pt left his recovery center yesterday to transition to another sober housing program today.  However, Pt was being homeless as he slept in his truck last night.  A bystander saw Pt with shaking and altered mental  health as the police were called which lead him to the ER visit today.     What is different about patient's functioning: Millie reported Pt has a long history of mental illness and heroin abuse.  Millie reported Pt might be affected by his sister who passed away 3 years ago due to heart issues from substance abuse.     Concern about alcohol/drug use:      What do you think the patient needs:      Has patient made comments about wanting to kill themselves/others: yes    If d/c is recommended, can they take part in safety/aftercare planning:  no    Additional collateral information:  Millie reported she was managing a sober housing and Pt could not stay with her.  Millie reported she did not feel safe having Pt being discharged today as she preferred inpatient psychiatric service.     Risk Assessment  Amity Suicide Severity Rating Scale Full Clinical Version:  Suicidal Ideation  Q1 Wish to be Dead (Lifetime): Yes  Q2 Non-Specific Active Suicidal Thoughts (Lifetime): Yes  3. Active Suicidal Ideation with any Methods (Not Plan) Without Intent to Act (Lifetime): Yes  Q4 Active Suicidal Ideation with Some Intent to Act, Without Specific Plan (Lifetime): Yes  Q5 Active Suicidal Ideation with Specific Plan and Intent (Lifetime): Yes  Q6 Suicide Behavior (Lifetime): no     Suicidal Behavior (Lifetime)  Actual Attempt (Lifetime): Yes  Total Number of Actual Attempts (Lifetime): 1  Actual Attempt Description (Lifetime): Pt reported he overdosed on Soma, muscle relaxant in his truck last night.  Has subject engaged in non-suicidal self-injurious behavior? (Lifetime): No  Interrupted Attempts (Lifetime): Yes  Total Number of Interrupted Attempts (Lifetime): 1  Interrupted Attempt Description (Lifetime): A bystander saw Pt in his truck today as he was shaking and altered mental health as the police were called.  Aborted or Self-Interrupted Attempt (Lifetime): No  Preparatory Acts or Behavior (Lifetime): No    Amity Suicide  Severity Rating Scale Recent:   Suicidal Ideation (Recent)  Q1 Wished to be Dead (Past Month): yes  Q2 Suicidal Thoughts (Past Month): yes  Q3 Suicidal Thought Method: yes  Q4 Suicidal Intent without Specific Plan: yes  Q5 Suicide Intent with Specific Plan: yes  Level of Risk per Screen: high risk  Intensity of Ideation (Recent)  Most Severe Ideation Rating (Past 1 Month): 4  Frequency (Past 1 Month): 2-5 times in week  Duration (Past 1 Month): 1-4 hours/a lot of time  Suicidal Behavior (Recent)  Actual Attempt (Past 3 Months): Yes  Total Number of Actual Attempts (Past 3 Months): 1  Actual Attempt Description (Past 3 Months): Pt reported he overdosed on Soma, muscle relaxant in his truck last night.  Has subject engaged in non-suicidal self-injurious behavior? (Past 3 Months): No  Interrupted Attempts (Past 3 Months): Yes  Total Number of Interrupted Attempts (Past 3 Months): 1  Interrupted Attempt Description (Past 3 Months): A bystander saw Pt in his truck today and intervened by calling the police today.  Aborted or Self-Interrupted Attempt (Past 3 Months): No  Preparatory Acts or Behavior (Past 3 Months): No    Environmental or Psychosocial Events: challenging interpersonal relationships, ongoing abuse of substances, neither working nor attending school, unemployment/underemployment, other life stressors, recent life events (see comment), social isolation, helplessness/hopelessness, unstable housing, homelessness, impulsivity/recklessness, excessive debt, poor finances  Protective Factors: Protective Factors: help seeking, constructive use of leisure time, enjoyable activities, resilience, reality testing ability, optimistic outlook - identification of future goals    Does the patient have thoughts of harming others? Feels Like Hurting Others: no  Previous Attempt to Hurt Others: no  Current presentation:  (Pt was calm, alert, oriented, engaged and cooperative.)  Is the patient engaging in sexually  inappropriate behavior?: no    Is the patient engaging in sexually inappropriate behavior?  no        Mental Status Exam   Affect: Constricted  Appearance: Disheveled, Appropriate  Attention Span/Concentration: Attentive, Inattentive  Eye Contact: Variable    Fund of Knowledge: Appropriate, Delayed   Language /Speech Content: Fluent  Language /Speech Volume: Normal  Language /Speech Rate/Productions: Normal, Slow  Recent Memory: Variable  Remote Memory: Variable  Mood: Anxious, Apathetic, Depressed, Sad  Orientation to Person: Yes   Orientation to Place: Yes  Orientation to Time of Day: Yes  Orientation to Date: Yes     Situation (Do they understand why they are here?): Yes  Psychomotor Behavior: Normal, Underactive  Thought Content: Clear, Suicidal  Thought Form: Intact     Mini-Cog Assessment  Number of Words Recalled:    Clock-Drawing Test:     Three Item Recall:    Mini-Cog Total Score:       Medication  Psychotropic medications:   Medication Orders - Psychiatric (From admission, onward)      Start     Dose/Rate Route Frequency Ordered Stop    03/20/24 1346  LORazepam (ATIVAN) injection 0.5 mg         0.5 mg Intravenous EVERY 15 MIN PRN 03/20/24 1346               Current Care Team  Patient Care Team:  Raymon Fajardo MD as PCP - General (Internal Medicine)  Leventhal, Thomas Michael, MD as MD (Gastroenterology)  Ulysses Caballero MD as Assigned PCP    Diagnosis  Patient Active Problem List   Diagnosis Code    Substance abuse in remission (H) F19.11    Hx of hepatitis C Z86.19    Opioid use disorder, mild, in early remission (H) F11.11    Attention deficit hyperactivity disorder (ADHD), unspecified ADHD type F90.9    Severe recurrent major depression without psychotic features (H) F33.2    HECTOR (generalized anxiety disorder) F41.1    Opioid type dependence, continuous (H) F11.20       Primary Problem This Admission  Active Hospital Problems    Severe recurrent major depression without psychotic features  "(H)      HECTOR (generalized anxiety disorder)      Opioid type dependence, continuous (H)        Clinical Summary and Substantiation of Recommendations   Pt presenting in the ER today with chief complaint of altered mental health, suicidal ideations and intentional ingestion.  Pt was just discharged from  treatment at [facility] yesterday. Pt being homeless and was in transition to another sober housing program. Pt stayed in his truck last night, became suicidal as he took unknown amount of Soma. Pt remarked, \"I'm homeless, I was basically sleeping in my truck and had tough times, I have drug problems, in and out of treatments.\" as his reason for visiting the ER today. Pt shared as he was staying in his truck last night, he started to ruminate about his future, being homeless and worried about his next sober housing might fall through as trigger to his current mental health crisis. Pt endorsed increased depression, cry, worry, racing thoughts and anxiety. Pt reported having poor sleep and loss of appetite. Pt denied having acute psychosis and tyler. Pt currently denied having suicidal ideations, but reported having thoughts of suicide last night as he decided to take Soma, muscle relaxant to end his life. Pt reported he initially took 3 to 4 pills of Soma, then took 15 to 20 pills more. Pt denied having homicidal ideations, access to firearms, and history of SIB. Pt denied previous suicide attempt as last night was his first suicide attempt.  Pt declined to psychiatric hospitalization and he was not holdable.  Pt's mom verified that he cannot stay at her place as he has no place to go.  Pt has high risk factors, including being homeless, recent suicide attempt last night by overdosing pills, limited coping skills and impaired judgment.  Pt was appropriate for observation in the ER and DEC re-assessment tomorrow.                          Patient coping skills attempted to reduce the crisis:  skateboarding, " playing video games, R/C cars, watching TV, listening to music    Disposition  Recommended disposition: Inpatient Mental Health, Observation        Reviewed case and recommendations with attending provider. Attending Name: Gomez Strong MD       Attending concurs with disposition: yes       Patient and/or validated legal guardian concurs with disposition:   no (Pt declined to inpatient psychiatric service recommendation.)       Final disposition:  observation    Legal status on admission:      Assessment Details   Total duration spent with the patient: 53 min     CPT code(s) utilized: 25811 - Psychotherapy for Crisis - 60 (30-74*) min    Lj Pena [facility], Psychotherapist  DEC - Triage & Transition Services  Callback: [phone]

## 2024-03-20 NOTE — PROGRESS NOTES
"Ulysses Fajardo  March 20, 2024  Plan of Care Hand-off Note     Patient Care Path: observation    Plan for Care:   Pt presenting in the ER today with chief complaint of altered mental health, suicidal ideations and intentional ingestion.  Pt was just discharged from CD treatment at [facility] yesterday. Pt being homeless and was in transition to another sober housing program. Pt stayed in his truck last night, became suicidal as he took unknown amount of Soma. Pt remarked, \"I'm homeless, I was basically sleeping in my truck and had tough times, I have drug problems, in and out of treatments.\" as his reason for visiting the ER today. Pt shared as he was staying in his truck last night, he started to ruminate about his future, being homeless and worried about his next sober housing might fall through as trigger to his current mental health crisis. Pt endorsed increased depression, cry, worry, racing thoughts and anxiety. Pt reported having poor sleep and loss of appetite. Pt denied having acute psychosis and tyler. Pt currently denied having suicidal ideations, but reported having thoughts of suicide last night as he decided to take Soma, muscle relaxant to end his life. Pt reported he initially took 3 to 4 pills of Soma, then took 15 to 20 pills more. Pt denied having homicidal ideations, access to firearms, and history of SIB. Pt denied previous suicide attempt as last night was his first suicide attempt.  Pt declined to psychiatric hospitalization and he was not holdable.  Pt's mom verified that he cannot stay at her place as he has no place to go.  Pt has high risk factors, including being homeless, recent suicide attempt last night by overdosing pills, limited coping skills and impaired judgment.  Pt was appropriate for observation in the ER and DEC re-assessment tomorrow.  EC order was completed for further therapeutic support and determine next level of care.    Identified Goals and Safety Issues: (P) Pt " had recent suicide attempt by taking unknown amount of Soma, muscle relaxant in his truck last night.  Pt identified going to another sober housing and finding a new job as his goals.    Overview:  (P) Millie Quintero [phone]       (P) Only allow calls and visits from designated visitors and care team members    Legal Status:      Psychiatry Consult:       Updated   regarding plan of care.           Lj Pena, SHERISW

## 2024-03-20 NOTE — ED PROVIDER NOTES
[facility] ED Provider Note   Patient: Ulysses Fajardo  MRN #:  [id number]  Date of Visit: March 20, 2024    CC:     Chief Complaint   Patient presents with    Altered Mental Status     HPI:  Ulysses Fajardo is a 34 year old male who presented to the emergency department EMS after he was found in his truck with altered mental status and shaking.  Patient has a history of opiate addiction, and apparently had been at the [facility] this past week getting patient treatment.  Last night, he apparently was not allowed to come into his mother's house, and was parked in his truck outside of his mother's home.  The engine was apparently left running, and he had a blanket.  A bystander walked by it this morning, found that he was shaking and seemed altered.  Police were dispatched to the scene, and found the patient with confusion, shaking, with difficulty speaking.  Patient was transported to the emergency department by ambulance.  Paramedics report that there was a box of pills in the middle console which they thought might have been Suboxone.  History was not able to be obtained from the patient upon arrival.  He indicated that he had recently used opiates but was unable to tell us when.  Medical records indicate a visit to the emergency department on February 8 for urinary frequency and lumbar radiculopathy.  Prior to that in June 2023, patient presented to [facility] with opioid withdrawal.  He was using fentanyl, and was started on Suboxone with instructions for follow-up.  Patient history was obtained from the patient's mother, Millie.  Patient was at the [facility] for the past week, but he checked himself out yesterday.  He has had periods of sobriety over the past year and a half including most recently 6 months, but he relapsed.  Prior to that he had 9 months of sobriety but also relapse.  The mother states that she manages a  recovery house, and the patient is not allowed to stay in there.  He apparently drove to the house, and slept outside in the truck.  He has some blankets.  Mom looked in the truck earlier this morning after the patient was brought here and she did not find any paraphernalia to suggest that he had been using IV drugs again.  His pills were scattered throughout the truck.  She is concerned about the possibility of suicide attempt but there is no suicide note.  He has never had a suicide attempt in the past.    Problem List:  Patient Active Problem List    Diagnosis Date Noted    Severe recurrent major depression without psychotic features (H) 03/20/2024     Priority: Medium    HECTOR (generalized anxiety disorder) 03/20/2024     Priority: Medium    Opioid type dependence, continuous (H) 03/20/2024     Priority: Medium    Opioid use disorder, mild, in early remission (H) 04/20/2023     Priority: Medium    Attention deficit hyperactivity disorder (ADHD), unspecified ADHD type 04/20/2023     Priority: Medium    Hx of hepatitis C 04/19/2023     Priority: Medium     2/2 IVDU.   RNA quant negative.      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)        MEDS: atomoxetine (STRATTERA) 40 MG capsule  Buprenorphine HCl-Naloxone HCl (SUBOXONE) 12-3 MG FILM per film  buprenorphine HCl-naloxone HCl (SUBOXONE) 8-2 MG per film  cloNIDine (CATAPRES) 0.1 MG tablet  FLUoxetine (PROZAC) 20 MG capsule  GLYCERIN, ADULT, 2 g suppository  hydrOXYzine (ATARAX) 50 MG tablet  methocarbamol (ROBAXIN) 500 MG tablet  methylPREDNISolone (MEDROL DOSEPAK) 4 MG tablet therapy pack  naloxone (NARCAN) 4 MG/0.1ML nasal spray  naproxen (NAPROSYN) 500 MG tablet  nicotine (NICODERM CQ) 21 MG/24HR 24 hr patch  nicotine polacrilex (NICORETTE) 4 MG gum  polyethylene glycol (MIRALAX) 17 GM/Dose  powder  propranolol (INDERAL) 10 MG tablet  QUEtiapine (SEROQUEL) 50 MG tablet  STOOL SOFTENER/LAXATIVE 50-8.6 MG tablet  traZODone (DESYREL) 50 MG tablet        ALLERGIES:  No Known Allergies    No past surgical history on file.    Social History     Tobacco Use    Smoking status: Former     Packs/day: 1     Types: Cigarettes     Quit date: 10/23/2021     Years since quittin.4    Smokeless tobacco: Never   Vaping Use    Vaping Use: Every day   Substance Use Topics    Alcohol use: Not Currently    Drug use: Yes     Types: Fentanyl         Review of Systems   Except as noted in HPI, all other systems were reviewed and are negative    Physical Exam   Vitals were reviewed  Patient Vitals for the past 24 hrs:   BP Temp Temp src Pulse Resp SpO2 Weight   24 1800 102/65 -- -- 98 -- -- --   24 1745 104/60 -- -- 91 -- -- --   24 1715 101/55 -- -- 85 -- -- --   24 1700 102/56 -- -- 81 -- -- --   24 1645 101/59 -- -- 85 -- -- --   24 1615 106/62 -- -- 98 25 -- --   24 1600 101/65 -- -- 94 24 -- --   24 1430 106/63 -- -- 111 (!) 8 97 % --   24 1400 100/53 -- -- 103 10 94 % --   24 1340 105/52 -- -- 109 (!) 6 92 % --   24 1315 107/55 -- -- 115 22 100 % --   24 1245 120/69 -- -- (!) 125 -- 97 % --   24 1230 103/48 -- -- (!) 121 14 94 % --   24 1200 104/56 -- -- (!) 123 12 96 % --   24 1145 93/50 -- -- 108 -- 94 % --   24 1130 104/65 -- -- 120 10 97 % --   24 1115 109/62 -- -- 120 10 -- --   24 1107 97/50 -- -- (!) 124 10 97 % --   24 1100 97/50 -- -- (!) 126 30 96 % --   24 1045 117/48 -- -- (!) 130 29 97 % --   24 1040 117/71 98.1  F (36.7  C) Oral (!) 130 12 97 % 75.3 kg (166 lb)    GCS:   Motor 5=Localizes pain   Verbal 2=Incomprehensible speech   Eye Opening 3=To speech   Total: 10       GENERAL APPEARANCE: Patient responds to verbal stimuli and pain.  Unable to speak.  BCS of 10  HEAD:  Atraumatic  FACE: normal facies; no asymmetry  EYES: Pupils are dilated, reactive to light,  HENT: normal external exam; no tongue injuries,  NECK: no adenopathy or asymmetry; no masses or abnormalities  RESP: normal respiratory effort; clear breath sounds bilaterally  CV: Heart rate, regular rhythm, no appreciable murmurs  ABD: soft, no tenderness; no rebound or guarding; bowel sounds are normal  MS: no gross deformities noted; normal muscle tone.  EXT: No calf tenderness or pitting edema; no visible injuries  : No signs of incontinence  SKIN: no worrisome rash; no signs of trauma.  No obvious needle marks  NEURO: no facial droop; no focal deficits, patient has difficulty finding words  PSYCH: Unable to assess initially.  Patient later states that he was feeling suicidal and took a bunch of Soma tablets.      Available Lab/Imaging Results     Results for orders placed or performed during the hospital encounter of 03/20/24 (from the past 24 hour(s))   CBC with platelets differential    Narrative    The following orders were created for panel order CBC with platelets differential.  Procedure                               Abnormality         Status                     ---------                               -----------         ------                     CBC with platelets and d...[[id number]]                      Final result                 Please view results for these tests on the individual orders.   Comprehensive metabolic panel   Result Value Ref Range    Sodium 137 135 - 145 mmol/L    Potassium 3.8 3.4 - 5.3 mmol/L    Carbon Dioxide (CO2) 18 (L) 22 - 29 mmol/L    Anion Gap 20 (H) 7 - 15 mmol/L    Urea Nitrogen 18.1 6.0 - 20.0 mg/dL    Creatinine 1.00 0.67 - 1.17 mg/dL    GFR Estimate >90 >60 mL/min/1.73m2    Calcium 9.9 8.6 - 10.0 mg/dL    Chloride 99 98 - 107 mmol/L    Glucose 129 (H) 70 - 99 mg/dL    Alkaline Phosphatase 68 40 - 150 U/L    AST 13 0 - 45 U/L    ALT 9 0 - 70 U/L    Protein Total 7.3 6.4 - 8.3  g/dL    Albumin 4.9 3.5 - 5.2 g/dL    Bilirubin Total 0.4 <=1.2 mg/dL   Lactic acid whole blood   Result Value Ref Range    Lactic Acid 8.5 (HH) 0.7 - 2.0 mmol/L   Troponin T, High Sensitivity   Result Value Ref Range    Troponin T, High Sensitivity <6 <=22 ng/L   Blood gas venous   Result Value Ref Range    pH Venous 7.32 7.32 - 7.43    pCO2 Venous 41 40 - 50 mm Hg    pO2 Venous 48 (H) 25 - 47 mm Hg    Bicarbonate Venous 21 21 - 28 mmol/L    Base Excess/Deficit Venous -4.7 (L) -3.0 - 3.0 mmol/L    FIO2 21     Oxyhemoglobin Venous 82 (H) 70 - 75 %    O2 Sat, Venous 82.7 (H) 70.0 - 75.0 %    Narrative    In healthy individuals, oxyhemoglobin (O2Hb) and oxygen saturation (SO2) are approximately equal. In the presence of dyshemoglobins, oxyhemoglobin can be considerably lower than oxygen saturation.   Ethyl Alcohol Level   Result Value Ref Range    Alcohol ethyl <0.01 <=0.01 g/dL   Carbon monoxide   Result Value Ref Range    Carbon Monoxide 1.2 0.0 - 2.0 %   Salicylate level   Result Value Ref Range    Salicylate <0.3   mg/dL   Acetaminophen level   Result Value Ref Range    Acetaminophen <5.0 (L) 10.0 - 30.0 ug/mL   CBC with platelets and differential   Result Value Ref Range    WBC Count 6.2 4.0 - 11.0 10e3/uL    RBC Count 5.07 4.40 - 5.90 10e6/uL    Hemoglobin 14.7 13.3 - 17.7 g/dL    Hematocrit 43.0 40.0 - 53.0 %    MCV 85 78 - 100 fL    MCH 29.0 26.5 - 33.0 pg    MCHC 34.2 31.5 - 36.5 g/dL    RDW 13.2 10.0 - 15.0 %    Platelet Count 306 150 - 450 10e3/uL    % Neutrophils 54 %    % Lymphocytes 35 %    % Monocytes 9 %    % Eosinophils 2 %    % Basophils 1 %    % Immature Granulocytes 0 %    NRBCs per 100 WBC 0 <1 /100    Absolute Neutrophils 3.4 1.6 - 8.3 10e3/uL    Absolute Lymphocytes 2.1 0.8 - 5.3 10e3/uL    Absolute Monocytes 0.5 0.0 - 1.3 10e3/uL    Absolute Eosinophils 0.1 0.0 - 0.7 10e3/uL    Absolute Basophils 0.0 0.0 - 0.2 10e3/uL    Absolute Immature Granulocytes 0.0 <=0.4 10e3/uL    Absolute NRBCs 0.0  10e3/uL   Extra Tube    Narrative    The following orders were created for panel order Extra Tube.  Procedure                               Abnormality         Status                     ---------                               -----------         ------                     Extra Blue Top Tube[[id number]]                              Final result                 Please view results for these tests on the individual orders.   Extra Blue Top Tube   Result Value Ref Range    Hold Specimen JIC    UA with Microscopic reflex to Culture    Specimen: Urine, Catheter   Result Value Ref Range    Color Urine Yellow Colorless, Straw, Light Yellow, Yellow    Appearance Urine Slightly Cloudy (A) Clear    Glucose Urine Negative Negative mg/dL    Bilirubin Urine Negative Negative    Ketones Urine 5 (A) Negative mg/dL    Specific Gravity Urine 1.033 1.003 - 1.035    Blood Urine Negative Negative    pH Urine 5.0 5.0 - 7.0    Protein Albumin Urine 30 (A) Negative mg/dL    Urobilinogen Urine Normal Normal, 2.0 mg/dL    Nitrite Urine Negative Negative    Leukocyte Esterase Urine Negative Negative    Bacteria Urine Few (A) None Seen /HPF    Mucus Urine Present (A) None Seen /LPF    RBC Urine 3 (H) <=2 /HPF    WBC Urine 7 (H) <=5 /HPF    Hyaline Casts Urine 8 (H) <=2 /LPF    Narrative    Urine Culture not indicated   Urine Drug Screen    Narrative    The following orders were created for panel order Urine Drug Screen.  Procedure                               Abnormality         Status                     ---------                               -----------         ------                     Urine Drug Screen Panel[[id number]]      Abnormal            Final result                 Please view results for these tests on the individual orders.   Urine Drug Screen Panel   Result Value Ref Range    Amphetamines Urine Screen Positive (A) Screen Negative    Barbituates Urine Screen Negative Screen Negative    Benzodiazepine Urine Screen Negative  Screen Negative    Cannabinoids Urine Screen Negative Screen Negative    Cocaine Urine Screen Negative Screen Negative    Fentanyl Qual Urine Screen Negative Screen Negative    Opiates Urine Screen Negative Screen Negative    PCP Urine Screen Negative Screen Negative   Lactic acid whole blood   Result Value Ref Range    Lactic Acid 1.9 0.7 - 2.0 mmol/L     EKG reviewed by me: Sinus tachycardia.  Short KS interval.  Peer interval of 114 ms, QT interval of 360 ms, QRS duration of 96 ms.  Diffuse ST depression.  Nonspecific T wave abnormality.           Impression     Final diagnoses:   Intentional drug overdose, carisoprodol (soma)   Homeless         ED Course & Medical Decision Making   Ulysses Fajardo is a 34 year old male who presented to the emergency department by EMS after he was found in his truck with altered mental status and was shaking.  Patient has a history of opiate/chemical dependency, and apparently had been at the [facility] for the past week getting help for his relapse.  Patient checked himself out of the facility yesterday, and in the evening drove to his mother's house.  He slept out in the truck overnight, and apparently at some point took an overdose of Soma that he had bought online.  Patient states he took a few at a time until he passed out.  He states he was in a dark place, and did not want to feel anymore.  He did not care at the time whether he lived or .  A bystander had noticed him in his truck, and called 911.  Police arrived, and his mother did not know that he was out in the truck until the police were there.  History was pieced together, and we were not sure whether he was in withdrawal from not taking his Suboxone.    Initial vital signs reveal a temp of 98.1, blood pressure of 117/71, heart rate of 130, respiratory rate of 12, 97% oxygen saturation on room air.  On exam, patient's pupils are dilated.  He is obtunded, and barely able to get a word out.  He is unable to  follow commands.  GCS score of 10.  The mother arrived a short time later, and provided some additional information.  She went through his truck and did not find any paraphernalia to suggest that he was injecting opiates.  There were pills scattered in the cabin of his truck.    Laboratory workup reveals an initial lactic acid level of 8.5, CBC reveals a normal white blood count and hemoglobin.      2:43 PM: Patient was much more alert, and I spent the last 15 minutes talking with the patient.  He admits to taking Soma which she had ordered internationally.  He states he came from [facility], and he started taking a bunch at a time last night until he passed out.  He states that he was in a dark place, not knowing what his future was going to hold and he did not want to feel anything anymore.  He did not care whether he lived or .  He has never had a suicide attempt before.  Patient was living in [place], and moved up to [place] 3 years ago for chemical dependency, one of his sisters ended up dying from endocarditis from IV drug use.    Spoke with Lj from the [employer].  Patient was able to contract for safety, and there is a safety plan in place.  Patient is still not back to his baseline level of functioning.  His mother does not feel comfortable bringing him back to the sober house that she manages.      7:00 PM: Patient ate supper, was able to get up to the bathroom independently and without difficulty.  He is able to form coherent sentences.  Patient feels that he is about 80% back to normal.  He is still somewhat foggy in his head.  Patient's mother has been visiting for the past hour.  We discussed inpatient treatment for his depression and chemical dependency.  Patient states that he he lost his sense of purpose when he lost his job recently.  He has some money that he can use to stay at a [id number].  His mother has removed any pills from his truck.  Patient was hoping to go to a sober house in [facility]  but he may not be able to get in now that he presented here to the ED.  I recommended that we reassess him in an hour.  In the meantime consider inpatient hospitalization for depression, suicidality, and chemical dependency.    8:00 PM: Patient was reassessed with his mother still in the room.  We had a long discussion again about his options.  He does not want inpatient treatment for mental health or chemical dependency.  He will stay in a motel tonight, and his mother will check in on him.  He will return to the emergency department if he has any thoughts of self-harm.  Patient feels that he is about 90% back to his normal self.      Written after-visit summary and instructions were given at the time of discharge.      Discharge Instructions:   You had a drug overdose of Soma.  Please stay in a motel tonight until you are fully recovered.  Return to the emergency department if you have any thoughts of self-harm or you just need help to clear your mind.  Try to get back to a job that will provide some stability and sense of purpose.  Seek help for your depression and ongoing escobedo for chemical dependency.        Critical care time: 45 minutes  Critical care was necessary to treat or prevent imminent or life-threatening deterioration. Critical care was time spent personally on the following activities: Obtaining history from EMS and mother, review of the chart, examination and reevaluation of the patient, discussion with [facility], repeat assessment of the patient's vital signs and pain level, discussion with the behavioral health specialist, development of treatment plan, discussing treatment issues, documenting in the patient chart, evaluation of patients response to treatment, and final disposition.      Disclaimer: This note consists of words and symbols derived from keyboarding and dictation using voice recognition software.  As a result, there may be errors that have gone undetected.  Please  consider this when interpreting information found in this note.       Gomez Strong MD  03/20/24 2000       Gomez Strong MD  03/20/24 [id number]

## 2024-03-21 NOTE — DISCHARGE INSTRUCTIONS
You had a drug overdose of Soma.  Please stay in a motel tonight until you are fully recovered.  Return to the emergency department if you have any thoughts of self-harm or you just need help to clear your mind.  Try to get back to a job that will provide some stability and sense of purpose.  Seek help for your depression and ongoing escobedo for chemical dependency.
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
## 2024-04-04 NOTE — DISCHARGE INSTRUCTIONS
Substance Use Disorder Direct Access Resources    It is recommended that you abstain from all mood altering chemicals. Please contact the sober support hotline ([phone]) as needed; phones are answered 24 hours a day, 7 days a week.    To access substance use treatment you must have a comprehensive assessment completed to begin any treatment program.     If uninsured, please contact your county of residence for eligibility screen to substance use disorder evaluation and treatment:    [place] - [phone]   [place] - [phone]   [facility] [phone]   [place] - [phone]   [place]  [phone]   [place] - [phone]   [facility] [phone]   [place] - [phone]     If you have private insurance, call the customer service number on the back of your insurance card to find an in-network substance abuse use disorder assessment. The ideal provider will be a treatment facility, licensed in the [facility].     Community YVROSE Evaluations: Clients may call their [facility] for a full list of providers - Availability and services listed belo are subject to change, please call the provider to confirm    [facility]  [phone]  [facility] [place], [place], [address]  *Please call the above number to schedule a comprehensive assessment for determination of level of care needs. In person and virtual appointments available Mon-Fri.    [facility], [facility] [address], First Floor, [address], [place] [address] (next to the outpatient lab)    Phone: [phone]   Provides bridging services to people with Opiate Use Disorders (OUD) seeking care. This is a front door to Medication Assisted Treatments (MAT), ages 16+  Walk In hours: Monday-Friday 9:00am-3:00pm    [facility]  [phone]  Walk in Assessments: Mon-Friday 7a-1:45p  [address], [place], [address]    [facility]  Central Access [phone]  [address] [place] [address]  [place], [place], [address]  *by appointment only    [place]  [phone] (phone consultation available )  Locations in: [place], [place], [facility], and [place], [place]  English virtual IOP [phone] or visit [email or web address]   Also offers LGBTQ programming     [facility]  [phone]  [address] [facility]  [place], [place], [address]  *Currently only offered via telehealth - call to set up an appointment    [facility]  [address] [place], [place], [address]  [facility]  For more information to to make a referral call:  [phone]  Walk-in on   9-11 a.m.    [facility]  [phone]  [address] [place], [place], [address]  *available by appointments only    [facility]  [phone]  [address] [place], [place], [address]  *available by appointment only    [facility]  [phone]  [address] [place], [place], [address]  *walk in assessments available M-F starting at 7 am.    [facility]  [phone]  Locations: [facility], [facility], and [place]  *Walk in assessments availble M-F starting at 8 am -virtual only    [facility]  [phone]  [address] [place] [address]    [facility]  Virtual + Locations: [place], [place], [place], [place], [facility], [place], [place], [place]   [phone]  *available by appointment only    [facility]  [phone]  [address]  [place], [place], [address]    [facility]  [phone]  [address], [place], [address]  *available by appointment    [facility]  [phone]  [address] [place], [place], [address]  *walk ins available M-TH from 9-3    [facility] program: [phone]  [address]  [place], [place], [address]    [facility]  [phone]  Same day substance use disorder assessments are available Monday - Friday, via walk-in or by appointment at the [place] location.  [address], [address], [place] [address]     [facility] - adolescent and adult SUDs services  [phone]  Offer services Monday through Friday, as well as evening hours Monday through Thursday. Normally, a first appointment will be scheduled within one week  [email or web address]  Locations all over [place]    If you are intoxicated, you may be required to detox at a detox facility before starting treatment. The following are detox facilities that you can self present to. All detox facilities are able to help you complete an assessment prior to discharge if you choose:    [facility]: Arrive at a [facility] for immediate medical evaluation    [facility]: [address] [place], [place], [address].         [phone]    [facility]: [address] [place], [place], [address]  [phone]     [facility] ([facility]): [address] [place], [place], [address]  [phone]     [place] [facility]: [address] [place], [place], [address], [phone]         Ways to help cope with sobriety:    -- Take prescribed medicines as scheduled  -- Keep follow-up appointments  -- Talk to others about your concerns  -- Get regular exercise  -- Practice deep breathing skills  -- Eat a healthy diet  -- Use community resources, including hotline numbers, [facility] crisis and support meetings  -- Stay sober and avoid places/people/things associated with substance use  --Maintain a daily schedule/routine  --Get at least 7-8 hours of sleep per night  --Create a list 10--20 healthy activities that you can do that are enjoyable and do not involve substance use  --Create daily goals (approx. 1-4 goals) per day and work to achieve them throughout the day.       Free  Resources:    [facility] ([facility])  [facility] connects people seeking recovery to resources that help foster and sustain long-term recovery. Whether you are seeking resources for treatment, transportation, housing, job training, education, health care or other pathways to recovery, [facility] is a great place to start.  Phone: [phone]. www.[place]Futurefleet.Discoverables (Great listing of all types of recovery and non-recovery related resources)    Alcoholics Anonymous  Phone: 7-321-ALCOHOL  Website: HTTP://WWW.AA.ORG/  AA [place] (124-859-1974 or http://aaminneaSquarespace.org)  AA [place] (221-824-6438 or www.aastpaul.org)     Narcotics Anonymous  Phone: 513.649.5518  Website: www.SmartExposee.Smart Adventure.    [employer] [facility] [address], [place], [place],  Phone: [phone]  Drop-in Hours: Monday-Friday 9-11:30 am. By appointment at other times.  Provides: [facility] is a drop-in center on the east side [facility] that provides a safe space for individuals who are homeless and have a history of chemical use. Sobriety is not a requirement but drugs and alcohol are not allowed on the property.  Services: Non-clients can access drop-in services such as Recovery and Harm Reduction Groups, referrals to case management, community activities, shower facilities, and a pool table. Individuals who are homeless and have chemical health needs may be eligible for enrollment into [facility]'s case management program. Clients and  work together to access benefits, treatment, health care, shelter, and external housing resources.         For shelter tonight, start with Adult Shelter Connect Overnight Shelter: [phone]  *Phone lines are closed between 5:30-7:30 pm    [address] (enter on the [address] side near the [facility] corner)  Walk-in Hours: 9 am - 5:30 pm Monday-Friday and 1 pm - 5:30 pm Saturday and Sunday    [facility]   [phone]  [address] [facility]    [facility]  [phone]  [address] [facility]    [facility]  [phone]  [address] [facility] [place]  [phone]  [address] [facility]    [facility]  [phone]  [address] [place] [address]    To start making a plan for a more long-term solution, contact the Coordinated Entry Homeless Assistance Program:    Coordinated Entry Homeless Assistance  [facility]  [address] [place] [address]  [phone]  Open Wednesdays and Thursdays 8 am-2 pm  The Coordinated Entry System is the [facility]'s approach to organizing and providing housing services for people experiencing homelessness in [facility].  Because housing resources are limited, this process is designed to ensure that individuals and families with the highest vulnerability, service needs, and length of homelessness receive top priority in housing placement.    Assessment changes due to COVID-19 virus    [facility] family assessors will now be conducting assessments by phone. If you are working with a family staying in a place other than a [facility] shelter and is eligible for Coordinated Entry, continue to contact [facility]  at [phone] to set up an assessment.    For single adults, [facility] will be suspending drop-in hours at [facility]. To have a Coordinated Entry assessment completed, call the [facility] Services' certified assessors: Wayne at [phone] or Shahnaz at [phone] directly to set up a time to meet    Call 211 at any time on any day for questions about services and resources available to you.      Family Shelters    [facility] Shelter Team  [phone]  [address] [facility], Floors 5 & 6              Youth, families & disabled adults    Hours: Mon-Fri 8:00 am-4:30 pm.  After-Hours Shelter Team   [address]      Drop-Ins    [facility]  [phone]  [address] [place] [address] ([facility] [place])              Showers/Laundry (8-11am)              Health Care              Employment Services              Breakfast (7-8 am)              Lunch (11:30am-12:30pm)    Hours: Mon-Sat: Summer 7am-3pm; Winter 7am-4pm.      [facility] [phone]  [address] [place] [address]  Hours: Mon, Wed and Fri 9:00-11:30 am      Clothing    [facility]  [phone]  [address] [place] [address]  Hours: M-Th 10-11:30am & 1:30-3:30pm; Sat/Sun 9:30-10:30 am      Free Meals & Showers    [facility]  [phone]  [address] [place] [address]  Dinner: Mon-Fri 5:30-6:30pm (No showers)    [facility]  [phone]  Meals ([address]): Women & Children M-F 8:30-9am; Everyone: M-F 12-1pm; Sat/Sun, 10:30-11:30 am  Showers ([address]): Mon-Fri 9-10 am    [facility]  [phone]  [address]  Dinner: Thurs - Mon 6 pm  Showers: Daily 8 - 4:30 pm    Health Care    [facility]  [phone]  Clinics are in 11 [place] shelters/drop-in centers.  Hours: Mon-Fri at varying sites. Call for sites/times. No insurance or appts required to receive care.  Clinic sites: [facility], [facility], [facility], [facility], [facility], [facility], [facility], [facility], [facility], [facility]

## 2024-04-04 NOTE — ED TRIAGE NOTES
Pt c/o left side facial numbness and left arm numbness that has progressively worsened since 3/20. Pt states that he was in for an overdose on 3/20 and symptoms started shortly after.      Triage Assessment (Adult)       Row Name 04/04/24 1046          Triage Assessment    Airway WDL WDL        Respiratory WDL    Respiratory WDL WDL        Cardiac WDL    Cardiac WDL WDL